# Patient Record
Sex: FEMALE | Race: WHITE | NOT HISPANIC OR LATINO | Employment: OTHER | URBAN - METROPOLITAN AREA
[De-identification: names, ages, dates, MRNs, and addresses within clinical notes are randomized per-mention and may not be internally consistent; named-entity substitution may affect disease eponyms.]

---

## 2017-05-11 ENCOUNTER — APPOINTMENT (OUTPATIENT)
Dept: WOMENS IMAGING | Facility: HOSPITAL | Age: 72
End: 2017-05-11

## 2017-05-11 PROCEDURE — G0279 TOMOSYNTHESIS, MAMMO: HCPCS | Performed by: RADIOLOGY

## 2017-05-11 PROCEDURE — G0204 DX MAMMO INCL CAD BI: HCPCS | Performed by: RADIOLOGY

## 2017-05-11 PROCEDURE — 76641 ULTRASOUND BREAST COMPLETE: CPT | Performed by: RADIOLOGY

## 2017-05-19 ENCOUNTER — APPOINTMENT (OUTPATIENT)
Dept: WOMENS IMAGING | Facility: HOSPITAL | Age: 72
End: 2017-05-19

## 2017-05-19 PROCEDURE — 19083 BX BREAST 1ST LESION US IMAG: CPT | Performed by: RADIOLOGY

## 2017-12-11 ENCOUNTER — APPOINTMENT (OUTPATIENT)
Dept: WOMENS IMAGING | Facility: HOSPITAL | Age: 72
End: 2017-12-11

## 2017-12-11 PROCEDURE — 76641 ULTRASOUND BREAST COMPLETE: CPT | Performed by: RADIOLOGY

## 2018-05-14 ENCOUNTER — APPOINTMENT (OUTPATIENT)
Dept: WOMENS IMAGING | Facility: HOSPITAL | Age: 73
End: 2018-05-14

## 2018-05-14 PROCEDURE — 77067 SCR MAMMO BI INCL CAD: CPT | Performed by: RADIOLOGY

## 2018-05-14 PROCEDURE — 77063 BREAST TOMOSYNTHESIS BI: CPT | Performed by: RADIOLOGY

## 2018-05-14 PROCEDURE — G0279 TOMOSYNTHESIS, MAMMO: HCPCS | Performed by: RADIOLOGY

## 2020-10-18 NOTE — PROGRESS NOTES
"Subjective   Chief Complaint   Patient presents with   • Headache     New PT        History of Present Illness   75 y.o. female presents as a new patient to establish care and discuss blood pressure concerns; former patient of Dr. Alan.     Notes her BP has been a little high running 140s/70s. Denies CP but has dyspnea secondary to asthma and started Dupixent with Dr. Frausto. Son has HTN and HLD. She is a former smoker--quit in 1980. Back on WW now and trying to lose weight--lost 7# this month.     Starts CPAP soon given newly diagnosed MEGAN.     Heavy vaginal bleeding \"like a period\" started last week. Dr. Peck upped her Progesterone to 200 mg daily and she continues Estradiol 0.025 mg patch twice weekly. If not resolved next month then will have hysterectomy.     Started Dupixant 300 mg daily--Dr. Frausto--recently.     Flu shot a couple of weeks ago at the pharmacy.     Patient Active Problem List   Diagnosis   • Mixed anxiety depressive disorder   • Hyperlipidemia   • Obesity (BMI 30-39.9)   • Nausea   • Malaise and fatigue   • Elevated blood pressure reading without diagnosis of hypertension   • Hypersomnia with sleep apnea - Dr Ledesma       Allergies   Allergen Reactions   • Pantoprazole Nausea Only     Other reaction(s): Headache   • Penicillins Hives       Current Outpatient Medications on File Prior to Visit   Medication Sig Dispense Refill   • ALPRAZolam (XANAX) 0.5 MG tablet Take 1 tablet by mouth.     • Ascorbic Acid (VITAMIN C) 500 MG capsule Take  by mouth.     • aspirin 81 MG chewable tablet Chew.     • Biotin 1000 MCG tablet Take  by mouth.     • budesonide-formoterol (SYMBICORT) 160-4.5 MCG/ACT inhaler Symbicort 160-4.5 MCG/ACT Inhalation Aerosol; Patient Sig: Symbicort 160-4.5 MCG/ACT Inhalation Aerosol ; 10; 0; 28-Jul-2015; Active     • buPROPion XL (WELLBUTRIN XL) 150 MG 24 hr tablet      • Cholecalciferol (VITAMIN D) 2000 UNITS capsule Take  by mouth.     • citalopram (CeleXA) 20 MG " tablet      • Dupixent 300 MG/2ML solution pen-injector      • EPINEPHrine (EPIPEN) 0.3 MG/0.3ML solution auto-injector injection EpiPen 2-Mitch 0.3 MG/0.3ML Injection Solution Auto-injector; Patient Sig: EpiPen 2-Mitch 0.3 MG/0.3ML Injection Solution Auto-injector ; 2; 0; 21-Nov-2014; Active     • esomeprazole (NexIUM) 40 MG capsule Take 1 capsule by mouth.     • Fish Oil-Krill Oil (KRILL OIL PLUS) capsule Take  by mouth.     • gabapentin (NEURONTIN) 100 MG capsule      • montelukast (SINGULAIR) 10 MG tablet Take  by mouth.     • Probiotic Product (ALIGN) capsule Take  by mouth.     • VENTOLIN  (90 BASE) MCG/ACT inhaler      • [DISCONTINUED] EMSAM 6 MG/24HR      • [DISCONTINUED] estradiol-norethindrone (COMBIPATCH) 0.05-0.14 MG/DAY patch Place  on the skin.     • [DISCONTINUED] mometasone-formoterol (DULERA 200) 200-5 MCG/ACT inhaler Dulera 200-5 MCG/ACT Inhalation Aerosol; Patient Sig: Dulera 200-5 MCG/ACT Inhalation Aerosol ; 0; 29-Sep-2014; Active     • [DISCONTINUED] Naproxen-Esomeprazole (VIMOVO PO) Take  by mouth 2 (two) times a day.     • [DISCONTINUED] ondansetron ODT (ZOFRAN-ODT) 8 MG disintegrating tablet      • [DISCONTINUED] progesterone (PROMETRIUM) 100 MG capsule        Current Facility-Administered Medications on File Prior to Visit   Medication Dose Route Frequency Provider Last Rate Last Dose   • promethazine (PHENERGAN) tablet 25 mg  25 mg Oral Q8H PRN Nj Coto MD           Past Medical History:   Diagnosis Date   • GERD (gastroesophageal reflux disease)    • Hemorrhoids 11/24/2015   • Hyperlipidemia    • Hypertension    • Impaired fasting glucose 11/24/2015    Description: NEW JO 09/14       Family History   Problem Relation Age of Onset   • Depression Mother    • Depression Brother    • Hypertension Daughter    • Hypertension Son        Social History     Socioeconomic History   • Marital status:      Spouse name: Not on file   • Number of children: Not on file   • Years of  "education: Not on file   • Highest education level: Not on file   Tobacco Use   • Smoking status: Former Smoker   Substance and Sexual Activity   • Alcohol use: No   • Drug use: No   • Sexual activity: Never       Past Surgical History:   Procedure Laterality Date   • BREAST SURGERY     • CHOLECYSTECTOMY     • COLONOSCOPY     • INCISIONAL BREAST BIOPSY     • OOPHORECTOMY     • TONSILLECTOMY AND ADENOIDECTOMY         The following portions of the patient's history were reviewed and updated as appropriate: problem list, allergies, current medications, past medical history, past family history, past social history and past surgical history.    Review of Systems   Constitutional: Negative for fatigue.   HENT: Negative for congestion.    Respiratory: Positive for shortness of breath.    Cardiovascular: Negative for chest pain.   Gastrointestinal: Positive for GERD.   Genitourinary: Positive for vaginal bleeding.   Musculoskeletal: Negative.    Skin: Negative.    Psychiatric/Behavioral: Positive for depressed mood. The patient is nervous/anxious.        Immunization History   Administered Date(s) Administered   • Flu Vaccine Quad PF >36MO 09/18/2018   • Fluzone High Dose =>65 Years ([facility] ONLY) 10/01/2015, 09/18/2018, 10/08/2019   • Hepatitis A 04/26/2018, 11/02/2018   • Influenza Quad Vaccine (Inpatient) 09/20/2017   • Influenza TIV (IM) 09/20/2017   • Influenza, Unspecified 10/15/2015   • Pneumococcal Conjugate 13-Valent (PCV13) 09/28/2015   • Shingrix 10/30/2019, 10/06/2020   • Td, Unspecified 08/15/2006   • Tdap 09/06/2016   • Zostavax 02/15/2007       Objective   Vitals:    10/19/20 1102   Temp: 97.3 °F (36.3 °C)   Weight: 112 kg (246 lb)   Height: 168.9 cm (66.5\")     Body mass index is 39.11 kg/m².  Physical Exam  Vitals signs reviewed.   Constitutional:       Appearance: Normal appearance. She is well-developed. She is obese.   HENT:      Head: Normocephalic and atraumatic.      Mouth/Throat:      Mouth: Mucous " membranes are moist.      Pharynx: Oropharynx is clear. No oropharyngeal exudate or posterior oropharyngeal erythema.   Neck:      Musculoskeletal: Neck supple.      Thyroid: No thyromegaly.      Vascular: No carotid bruit.   Cardiovascular:      Rate and Rhythm: Normal rate and regular rhythm.      Heart sounds: Normal heart sounds, S1 normal and S2 normal.   Pulmonary:      Effort: Pulmonary effort is normal.      Breath sounds: Normal breath sounds.   Musculoskeletal:      Comments: Ambulates without assistance; no clubbing, cyanosis or edema.    Skin:     General: Skin is warm and dry.   Neurological:      Mental Status: She is alert.   Psychiatric:         Mood and Affect: Mood normal.         Behavior: Behavior normal.         Procedures    Assessment/Plan   Diagnoses and all orders for this visit:    1. Elevated blood pressure reading without diagnosis of hypertension (Primary)  Comments:  Labs today--recheck BP in 2 weeks. Weight loss via continued dietary changes advised.     2. Hyperlipidemia, unspecified hyperlipidemia type  -     Lipid Panel With / Chol / HDL Ratio    3. Need for hepatitis C screening test  -     Hepatitis C Antibody    4. Healthcare maintenance  -     Comprehensive Metabolic Panel    5. Need for 23-polyvalent pneumococcal polysaccharide vaccine  -     Pneumococcal Polysaccharide Vaccine 23-Valent Greater Than or Equal To 3yo Subcutaneous / IM    Records reviewed include previous OV with myself as well as labs.     Tells me her shortness of breath is secondary to her asthma for which she recently restarted Symbicort and started Dupixent with Dr. Frausto. Will see her back in 2 weeks and will get an EKG if BP elevated and consider CXR.     Requested last OV with Dr. Peck and Dr. Frausto.     Return in about 2 weeks (around 11/2/2020) for Medicare Wellness, Lab Today.

## 2020-10-19 ENCOUNTER — OFFICE VISIT (OUTPATIENT)
Dept: INTERNAL MEDICINE | Facility: CLINIC | Age: 75
End: 2020-10-19

## 2020-10-19 VITALS
WEIGHT: 246 LBS | BODY MASS INDEX: 38.61 KG/M2 | SYSTOLIC BLOOD PRESSURE: 140 MMHG | TEMPERATURE: 97.3 F | RESPIRATION RATE: 14 BRPM | DIASTOLIC BLOOD PRESSURE: 90 MMHG | HEIGHT: 67 IN | HEART RATE: 68 BPM

## 2020-10-19 DIAGNOSIS — Z11.59 NEED FOR HEPATITIS C SCREENING TEST: ICD-10-CM

## 2020-10-19 DIAGNOSIS — Z23 NEED FOR 23-POLYVALENT PNEUMOCOCCAL POLYSACCHARIDE VACCINE: ICD-10-CM

## 2020-10-19 DIAGNOSIS — E78.5 HYPERLIPIDEMIA, UNSPECIFIED HYPERLIPIDEMIA TYPE: ICD-10-CM

## 2020-10-19 DIAGNOSIS — R03.0 ELEVATED BLOOD PRESSURE READING WITHOUT DIAGNOSIS OF HYPERTENSION: Primary | ICD-10-CM

## 2020-10-19 DIAGNOSIS — Z00.00 HEALTHCARE MAINTENANCE: ICD-10-CM

## 2020-10-19 PROCEDURE — G0009 ADMIN PNEUMOCOCCAL VACCINE: HCPCS | Performed by: NURSE PRACTITIONER

## 2020-10-19 PROCEDURE — 90732 PPSV23 VACC 2 YRS+ SUBQ/IM: CPT | Performed by: NURSE PRACTITIONER

## 2020-10-19 PROCEDURE — 99203 OFFICE O/P NEW LOW 30 MIN: CPT | Performed by: NURSE PRACTITIONER

## 2020-10-19 RX ORDER — BUPROPION HYDROCHLORIDE 150 MG/1
TABLET ORAL
COMMUNITY
Start: 2020-10-04

## 2020-10-19 RX ORDER — CITALOPRAM 20 MG/1
TABLET ORAL
COMMUNITY
Start: 2020-10-13

## 2020-10-19 RX ORDER — DUPILUMAB 300 MG/2ML
INJECTION, SOLUTION SUBCUTANEOUS
COMMUNITY
Start: 2020-10-08

## 2020-10-19 RX ORDER — ESTRADIOL 0.03 MG/D
1 FILM, EXTENDED RELEASE TRANSDERMAL 2 TIMES WEEKLY
COMMUNITY
End: 2022-11-27

## 2020-10-20 LAB
ALBUMIN SERPL-MCNC: 4.5 G/DL (ref 3.5–5.2)
ALBUMIN/GLOB SERPL: 1.8 G/DL
ALP SERPL-CCNC: 73 U/L (ref 39–117)
ALT SERPL-CCNC: 18 U/L (ref 1–33)
AST SERPL-CCNC: 19 U/L (ref 1–32)
BILIRUB SERPL-MCNC: 0.4 MG/DL (ref 0–1.2)
BUN SERPL-MCNC: 18 MG/DL (ref 8–23)
BUN/CREAT SERPL: 17.6 (ref 7–25)
CALCIUM SERPL-MCNC: 9.2 MG/DL (ref 8.6–10.5)
CHLORIDE SERPL-SCNC: 103 MMOL/L (ref 98–107)
CHOLEST SERPL-MCNC: 243 MG/DL (ref 0–200)
CHOLEST/HDLC SERPL: 2.67 {RATIO}
CO2 SERPL-SCNC: 24.2 MMOL/L (ref 22–29)
CREAT SERPL-MCNC: 1.02 MG/DL (ref 0.57–1)
GLOBULIN SER CALC-MCNC: 2.5 GM/DL
GLUCOSE SERPL-MCNC: 106 MG/DL (ref 65–99)
HCV AB S/CO SERPL IA: <0.1 S/CO RATIO (ref 0–0.9)
HDLC SERPL-MCNC: 91 MG/DL (ref 40–60)
LDLC SERPL CALC-MCNC: 140 MG/DL (ref 0–100)
POTASSIUM SERPL-SCNC: 4.8 MMOL/L (ref 3.5–5.2)
PROT SERPL-MCNC: 7 G/DL (ref 6–8.5)
SODIUM SERPL-SCNC: 136 MMOL/L (ref 136–145)
TRIGL SERPL-MCNC: 72 MG/DL (ref 0–150)
VLDLC SERPL CALC-MCNC: 12 MG/DL (ref 5–40)

## 2020-10-21 ENCOUNTER — TELEPHONE (OUTPATIENT)
Dept: INTERNAL MEDICINE | Facility: CLINIC | Age: 75
End: 2020-10-21

## 2020-10-21 DIAGNOSIS — E78.5 HYPERLIPIDEMIA, UNSPECIFIED HYPERLIPIDEMIA TYPE: ICD-10-CM

## 2020-10-21 DIAGNOSIS — R73.01 IMPAIRED FASTING GLUCOSE: Primary | ICD-10-CM

## 2020-10-21 RX ORDER — ROSUVASTATIN CALCIUM 5 MG/1
5 TABLET, COATED ORAL DAILY
Qty: 90 TABLET | Refills: 1 | Status: SHIPPED | OUTPATIENT
Start: 2020-10-21 | End: 2021-03-02 | Stop reason: SDUPTHER

## 2020-10-21 NOTE — TELEPHONE ENCOUNTER
PATIENT WAS CALLING TO CLARIFY IF SHE WAS SUPPOSED TO START BP MEDS OR WAIT UNTIL AFTER 2 WEEKS FOLLOW UP    PRESCRIPTION FOR ROSUVASTATIN WAS CALLED IN TO [facility] [place] FOR HER, BUT SHE THOUGHT SHE WAS SUPPOSED TO WAIT.     PLEASE ADVISE PATIENT  [phone]

## 2020-10-21 NOTE — TELEPHONE ENCOUNTER
Crestor is for her cholesterol. Will check her blood pressure at upcoming visit and go from there regarding whether she needs medication for it.

## 2020-10-26 ENCOUNTER — RESULTS ENCOUNTER (OUTPATIENT)
Dept: INTERNAL MEDICINE | Facility: CLINIC | Age: 75
End: 2020-10-26

## 2020-10-26 DIAGNOSIS — R73.01 IMPAIRED FASTING GLUCOSE: ICD-10-CM

## 2020-10-30 ENCOUNTER — LAB (OUTPATIENT)
Dept: INTERNAL MEDICINE | Facility: CLINIC | Age: 75
End: 2020-10-30

## 2020-10-30 LAB — HBA1C MFR BLD: 5.4 % (ref 4.8–5.6)

## 2020-11-01 NOTE — PROGRESS NOTES
The ABCs of the Annual Wellness Visit  Subsequent Medicare Wellness Visit    No chief complaint on file.      Subjective   History of Present Illness:  Luzmaria Pérez is a 75 y.o. female who presents for a Subsequent Medicare Wellness Visit.    Also presents for follow up of her blood pressure which was elevated at previous visit. Started Crestor and tolerating it pretty well--has to take Tylenol or Advil in the evening--relieves her joint pain; feels this may be as a result of Dupixent.     5 days into using CPAP. Feeling more rested and hopes to taper off Neurontin soon. Denies CP. Dyspnea (secondary to asthma per patient) stable. Fell off the wagon with WW over Halloween and gained 3# sin e last visit. Realizes this is not sustainable.     DUB stopped with increase in progesterone and decreased estrogen patch with Dr. Peck.      HEALTH RISK ASSESSMENT    Recent Hospitalizations:  No hospitalization(s) within the last year.    Current Medical Providers:  Patient Care Team:  Ameena Santos MD (Inactive) as PCP - General    Smoking Status:  Social History     Tobacco Use   Smoking Status Former Smoker       Alcohol Consumption:  Social History     Substance and Sexual Activity   Alcohol Use No       Depression Screen:   PHQ-2/PHQ-9 Depression Screening 11/1/2020   Little interest or pleasure in doing things Not at all   Feeling down, depressed, or hopeless Not at all   Trouble falling or staying asleep, or sleeping too much Several days   Feeling tired or having little energy More than half the days   Poor appetite or overeating Not at all   Feeling bad about yourself - or that you are a failure or have let yourself or your family down Not at all   Trouble concentrating on things, such as reading the newspaper or watching television Not at all   Moving or speaking so slowly that other people could have noticed. Or the opposite - being so fidgety or restless that you have been moving around a lot more than usual Not  at all   Thoughts that you would be better off dead, or of hurting yourself in some way Not at all   How difficult have these problems made it for you to do your work, take care of things at home, or get along with other people? Not difficult at all       Fall Risk Screen:  CLAUDIA Fall Risk Assessment has not been completed.    Health Habits and Functional and Cognitive Screening:  No flowsheet data found.      Does the patient have evidence of cognitive impairment? No    Asprin use counseling:Does not need AS but is currently taking (discussed benefits vs risks and patient elects to stay on ASA)    Age-appropriate Screening Schedule:  Refer to the list below for future screening recommendations based on patient's age, sex and/or medical conditions. Orders for these recommended tests are listed in the plan section. The patient has been provided with a written plan.    Health Maintenance   Topic Date Due   • INFLUENZA VACCINE  08/01/2020   • LIPID PANEL  10/19/2021   • COLONOSCOPY  01/17/2022   • TDAP/TD VACCINES (2 - Td) 09/06/2026   • ZOSTER VACCINE  Completed          The following portions of the patient's history were reviewed and updated as appropriate: allergies, current medications, past family history, past medical history, past social history, past surgical history and problem list.    Outpatient Medications Prior to Visit   Medication Sig Dispense Refill   • ALPRAZolam (XANAX) 0.5 MG tablet Take 1 tablet by mouth.     • Ascorbic Acid (VITAMIN C) 500 MG capsule Take  by mouth.     • aspirin 81 MG chewable tablet Chew.     • Biotin 1000 MCG tablet Take  by mouth.     • budesonide-formoterol (SYMBICORT) 160-4.5 MCG/ACT inhaler Symbicort 160-4.5 MCG/ACT Inhalation Aerosol; Patient Sig: Symbicort 160-4.5 MCG/ACT Inhalation Aerosol ; 10; 0; 28-Jul-2015; Active     • buPROPion XL (WELLBUTRIN XL) 150 MG 24 hr tablet      • Cholecalciferol (VITAMIN D) 2000 UNITS capsule Take  by mouth.     • citalopram (CeleXA) 20  MG tablet      • Dupixent 300 MG/2ML solution pen-injector      • EPINEPHrine (EPIPEN) 0.3 MG/0.3ML solution auto-injector injection EpiPen 2-Mitch 0.3 MG/0.3ML Injection Solution Auto-injector; Patient Sig: EpiPen 2-Mitch 0.3 MG/0.3ML Injection Solution Auto-injector ; 2; 0; 21-Nov-2014; Active     • esomeprazole (NexIUM) 40 MG capsule Take 1 capsule by mouth.     • estradiol (VIVELLE-DOT) 0.025 MG/24HR patch Place 1 patch on the skin as directed by provider 2 (Two) Times a Week.     • Fish Oil-Krill Oil (KRILL OIL PLUS) capsule Take  by mouth.     • gabapentin (NEURONTIN) 100 MG capsule      • montelukast (SINGULAIR) 10 MG tablet Take  by mouth.     • Probiotic Product (ALIGN) capsule Take  by mouth.     • progesterone (PROMETRIUM) 200 MG capsule Take 200 mg by mouth Daily.     • rosuvastatin (Crestor) 5 MG tablet Take 1 tablet by mouth Daily. 90 tablet 1   • VENTOLIN  (90 BASE) MCG/ACT inhaler        Facility-Administered Medications Prior to Visit   Medication Dose Route Frequency Provider Last Rate Last Dose   • promethazine (PHENERGAN) tablet 25 mg  25 mg Oral Q8H PRN Nj Coto MD           Patient Active Problem List   Diagnosis   • Mixed anxiety depressive disorder   • Hyperlipidemia   • Obesity (BMI 30-39.9)   • Nausea   • Malaise and fatigue   • Elevated blood pressure reading without diagnosis of hypertension   • Hypersomnia with sleep apnea - Dr Ledesma       Advanced Care Planning:  ACP discussion was held with the patient during this visit. Patient does not have an advance directive, information provided.    Review of Systems   Respiratory:        Dyspnea stable--recently started Dupixent.    Cardiovascular: Negative for chest pain.       Compared to one year ago, the patient feels her physical health is worse.  Compared to one year ago, the patient feels her mental health is the same.    Reviewed chart for potential of high risk medication in the elderly: yes  Reviewed chart for potential  of harmful drug interactions in the elderly:yes    Objective       There were no vitals filed for this visit.    There is no height or weight on file to calculate BMI.  Discussed the patient's BMI with her. The BMI is above average; BMI management plan is completed.    Physical Exam  Vitals signs reviewed.   Constitutional:       Appearance: Normal appearance. She is well-developed. She is obese.   HENT:      Head: Normocephalic and atraumatic.   Cardiovascular:      Rate and Rhythm: Normal rate and regular rhythm.      Heart sounds: Normal heart sounds, S1 normal and S2 normal.   Pulmonary:      Effort: Pulmonary effort is normal.      Breath sounds: Normal breath sounds.   Skin:     General: Skin is warm and dry.   Neurological:      Mental Status: She is alert.   Psychiatric:         Mood and Affect: Mood normal.         Behavior: Behavior normal.         Lab Results   Component Value Date     (H) 10/19/2020    CHLPL 243 (H) 10/19/2020    TRIG 72 10/19/2020    HDL 91 (H) 10/19/2020     (H) 10/19/2020    VLDL 12 10/19/2020    HGBA1C 5.40 10/30/2020        Assessment/Plan   Medicare Risks and Personalized Health Plan  CMS Preventative Services Quick Reference  Immunizations Discussed/Encouraged (specific immunizations; Influenza )    The above risks/problems have been discussed with the patient.  Pertinent information has been shared with the patient in the After Visit Summary.  Follow up plans and orders are seen below in the Assessment/Plan Section.    Diagnoses and all orders for this visit:    1. Medicare annual wellness visit, subsequent (Primary)  Comments:  Living Will advised. Flu shot ealtier this month at local pharmacy.     2. Morbidly obese (CMS/[facility])  Comments:  Weight loss via dietary changes & 150 minutes weekly exercise advised--encouraged 10 minutes once daily for 1-2 weeks then add 5 minutes in afternoon increasing    3. Hyperlipidemia, unspecified hyperlipidemia  type  Comments:  Tolerating Crestor--recheck LFTs and FLP in 3 months    4. Elevated blood pressure reading in office without diagnosis of hypertension  Comments:  Controlled at this time.       Follow Up:  No follow-ups on file.     Records reviewed include previous OV with myself as well as labs.     An After Visit Summary and PPPS were given to the patient.

## 2020-11-02 ENCOUNTER — OFFICE VISIT (OUTPATIENT)
Dept: INTERNAL MEDICINE | Facility: CLINIC | Age: 75
End: 2020-11-02

## 2020-11-02 VITALS
TEMPERATURE: 97.4 F | RESPIRATION RATE: 12 BRPM | SYSTOLIC BLOOD PRESSURE: 112 MMHG | WEIGHT: 249 LBS | BODY MASS INDEX: 39.08 KG/M2 | HEIGHT: 67 IN | HEART RATE: 84 BPM | DIASTOLIC BLOOD PRESSURE: 70 MMHG

## 2020-11-02 DIAGNOSIS — E78.5 HYPERLIPIDEMIA, UNSPECIFIED HYPERLIPIDEMIA TYPE: ICD-10-CM

## 2020-11-02 DIAGNOSIS — E66.01 MORBIDLY OBESE (HCC): ICD-10-CM

## 2020-11-02 DIAGNOSIS — Z00.00 MEDICARE ANNUAL WELLNESS VISIT, SUBSEQUENT: Primary | ICD-10-CM

## 2020-11-02 DIAGNOSIS — R03.0 ELEVATED BLOOD PRESSURE READING IN OFFICE WITHOUT DIAGNOSIS OF HYPERTENSION: ICD-10-CM

## 2020-11-02 PROCEDURE — 99213 OFFICE O/P EST LOW 20 MIN: CPT | Performed by: NURSE PRACTITIONER

## 2020-11-02 PROCEDURE — G0439 PPPS, SUBSEQ VISIT: HCPCS | Performed by: NURSE PRACTITIONER

## 2021-03-02 DIAGNOSIS — R73.01 IMPAIRED FASTING GLUCOSE: Primary | ICD-10-CM

## 2021-03-02 DIAGNOSIS — E78.5 HYPERLIPIDEMIA, UNSPECIFIED HYPERLIPIDEMIA TYPE: ICD-10-CM

## 2021-03-03 RX ORDER — ROSUVASTATIN CALCIUM 5 MG/1
5 TABLET, COATED ORAL DAILY
Qty: 90 TABLET | Refills: 1 | Status: SHIPPED | OUTPATIENT
Start: 2021-03-03 | End: 2021-05-03 | Stop reason: SDUPTHER

## 2021-04-30 PROBLEM — K21.9 GERD (GASTROESOPHAGEAL REFLUX DISEASE): Status: ACTIVE | Noted: 2021-04-30

## 2021-05-02 RX ORDER — AZELASTINE HYDROCHLORIDE, FLUTICASONE PROPIONATE 137; 50 UG/1; UG/1
SPRAY, METERED NASAL
COMMUNITY
Start: 2021-02-14

## 2021-05-03 ENCOUNTER — OFFICE VISIT (OUTPATIENT)
Dept: INTERNAL MEDICINE | Facility: CLINIC | Age: 76
End: 2021-05-03

## 2021-05-03 VITALS — WEIGHT: 250 LBS | BODY MASS INDEX: 39.24 KG/M2 | HEIGHT: 67 IN | TEMPERATURE: 96.9 F

## 2021-05-03 DIAGNOSIS — E78.5 HYPERLIPIDEMIA, UNSPECIFIED HYPERLIPIDEMIA TYPE: Primary | ICD-10-CM

## 2021-05-03 DIAGNOSIS — Z00.00 HEALTHCARE MAINTENANCE: ICD-10-CM

## 2021-05-03 DIAGNOSIS — Z92.29 COVID-19 VACCINE SERIES COMPLETED: ICD-10-CM

## 2021-05-03 DIAGNOSIS — E66.01 MORBIDLY OBESE (HCC): ICD-10-CM

## 2021-05-03 DIAGNOSIS — K21.9 GASTROESOPHAGEAL REFLUX DISEASE, UNSPECIFIED WHETHER ESOPHAGITIS PRESENT: ICD-10-CM

## 2021-05-03 DIAGNOSIS — R73.01 IMPAIRED FASTING GLUCOSE: ICD-10-CM

## 2021-05-03 PROCEDURE — 99214 OFFICE O/P EST MOD 30 MIN: CPT | Performed by: NURSE PRACTITIONER

## 2021-05-03 RX ORDER — ROSUVASTATIN CALCIUM 5 MG/1
5 TABLET, COATED ORAL DAILY
Qty: 90 TABLET | Refills: 3 | Status: SHIPPED | OUTPATIENT
Start: 2021-05-03 | End: 2022-06-14

## 2021-09-07 ENCOUNTER — TELEPHONE (OUTPATIENT)
Dept: INTERNAL MEDICINE | Facility: CLINIC | Age: 76
End: 2021-09-07

## 2021-09-07 NOTE — TELEPHONE ENCOUNTER
PATIENT CALLING WANTING TO KNOW IF  WANTED TO START HER ON BLOOD PRESSURE MEDICATION DUE TO ELEVATED LEVELS SHE STATED HER BLOOD PRESSURE THIS MORNING /92, 166/88.    PLEASE ADVISE  [phone]

## 2021-09-08 NOTE — PROGRESS NOTES
Subjective   Chief Complaint   Patient presents with   • Hypertension     Was at Allergist on Tuesday and they told her BP was elevated        History of Present Illness   76 y.o. female presents with reports of elevated home blood pressure. She has a history of HTN which she has bee treated in the past bur improved with [facility] to the point she was able to get of BP meds (HCTZ 12.5 mg daily) with previous provider. Notes BP elevated at her allergist 2 days ago--160s/88-92. Notes dull HA. Denies CP; dyspnea related to her asthma--Dupixent has helped; managed by Dr. Frausto. She is down 5# and has just gotten cleared by ortho to get back in the pool.       Patient Active Problem List   Diagnosis   • Mixed anxiety depressive disorder   • Hyperlipidemia   • Impaired fasting glucose   • Obesity (BMI 30-39.9)   • Nausea   • Malaise and fatigue   • Hypersomnia with sleep apnea - Dr Ledesma   • Morbidly obese (CMS/HCC)   • Asthma   • GERD (gastroesophageal reflux disease)   • Arthritis of left knee   • Essential hypertension       Allergies   Allergen Reactions   • Adhesive Tape Hives   • Pantoprazole Nausea Only     Other reaction(s): Headache   • Penicillins Hives       Current Outpatient Medications on File Prior to Visit   Medication Sig Dispense Refill   • ALPRAZolam (XANAX) 0.5 MG tablet Take 1 tablet by mouth.     • Ascorbic Acid (VITAMIN C) 500 MG capsule Take  by mouth.     • aspirin 81 MG chewable tablet Chew.     • Azelastine-Fluticasone 137-50 MCG/ACT suspension      • Biotin 1000 MCG tablet Take  by mouth.     • budesonide-formoterol (SYMBICORT) 160-4.5 MCG/ACT inhaler Symbicort 160-4.5 MCG/ACT Inhalation Aerosol; Patient Sig: Symbicort 160-4.5 MCG/ACT Inhalation Aerosol ; 10; 0; 28-Jul-2015; Active     • buPROPion XL (WELLBUTRIN XL) 150 MG 24 hr tablet      • Cholecalciferol (VITAMIN D) 2000 UNITS capsule Take  by mouth.     • citalopram (CeleXA) 20 MG tablet      • Dupixent 300 MG/2ML solution pen-injector       • EPINEPHrine (EPIPEN) 0.3 MG/0.3ML solution auto-injector injection EpiPen 2-Mitch 0.3 MG/0.3ML Injection Solution Auto-injector; Patient Sig: EpiPen 2-Mitch 0.3 MG/0.3ML Injection Solution Auto-injector ; 2; 0; 21-Nov-2014; Active     • esomeprazole (NexIUM) 40 MG capsule Take 1 capsule by mouth.     • estradiol (VIVELLE-DOT) 0.025 MG/24HR patch Place 1 patch on the skin as directed by provider 2 (Two) Times a Week.     • Fish Oil-Krill Oil (KRILL OIL PLUS) capsule Take  by mouth.     • gabapentin (NEURONTIN) 100 MG capsule 100 mg Daily.     • montelukast (SINGULAIR) 10 MG tablet Take  by mouth.     • Probiotic Product (ALIGN) capsule Take  by mouth.     • progesterone (PROMETRIUM) 200 MG capsule Take 200 mg by mouth Daily.     • rosuvastatin (Crestor) 5 MG tablet Take 1 tablet by mouth Daily. 90 tablet 3   • VENTOLIN  (90 BASE) MCG/ACT inhaler        Current Facility-Administered Medications on File Prior to Visit   Medication Dose Route Frequency Provider Last Rate Last Admin   • promethazine (PHENERGAN) tablet 25 mg  25 mg Oral Q8H PRN Nj Coto MD           Past Medical History:   Diagnosis Date   • Achilles tendinitis of right lower extremity    • Esophageal stricture    • GERD (gastroesophageal reflux disease)    • Hemorrhoids 11/24/2015   • Hyperlipidemia    • Impaired fasting glucose 11/24/2015    Description: NEW DXZ 09/14   • Inflammatory fibroid polyps of stomach        Family History   Problem Relation Age of Onset   • Depression Mother    • Depression Brother    • Hypertension Daughter    • Hypertension Son        Social History     Socioeconomic History   • Marital status:      Spouse name: Not on file   • Number of children: Not on file   • Years of education: Not on file   • Highest education level: Not on file   Tobacco Use   • Smoking status: Former Smoker   Substance and Sexual Activity   • Alcohol use: No   • Drug use: No   • Sexual activity: Never       Past Surgical  "History:   Procedure Laterality Date   • BREAST SURGERY     • CHOLECYSTECTOMY     • COLONOSCOPY     • INCISIONAL BREAST BIOPSY     • OOPHORECTOMY     • REPLACEMENT TOTAL KNEE Left    • TONSILLECTOMY AND ADENOIDECTOMY     • TOTAL SHOULDER ARTHROPLASTY         The following portions of the patient's history were reviewed and updated as appropriate: problem list, allergies, current medications, past medical history and past social history.    Review of Systems    Immunization History   Administered Date(s) Administered   • COVID-19 (PFIZER) 02/04/2021, 02/25/2021   • FLUAD TRI 65YR+ 10/06/2020   • Flu Vaccine Quad PF >36MO 09/18/2018   • Fluzone High Dose =>65 Years ([facility] ONLY) 10/01/2015, 09/18/2018, 10/08/2019   • Fluzone High-Dose 65+yrs 08/25/2021   • Hepatitis A 04/26/2018, 11/02/2018   • Influenza Quad Vaccine (Inpatient) 09/20/2017   • Influenza TIV (IM) 09/20/2017   • Influenza, Unspecified 10/15/2015   • Pneumococcal Conjugate 13-Valent (PCV13) 09/28/2015   • Pneumococcal Polysaccharide (PPSV23) 10/19/2020   • Shingrix 10/30/2019, 10/06/2020, 10/23/2020   • TD Preservative Free 07/15/2021   • Td, Unspecified 08/15/2006   • Tdap 09/06/2016   • Zostavax 02/15/2007       Objective   Vitals:    09/09/21 1252   Temp: 97.4 °F (36.3 °C)   Weight: 111 kg (245 lb)   Height: 168.9 cm (66.5\")     Body mass index is 38.95 kg/m².  Physical Exam  Vitals reviewed.   Constitutional:       Appearance: Normal appearance. She is well-developed.   HENT:      Head: Normocephalic and atraumatic.   Cardiovascular:      Rate and Rhythm: Normal rate and regular rhythm.      Heart sounds: Normal heart sounds, S1 normal and S2 normal.   Pulmonary:      Effort: Pulmonary effort is normal.      Breath sounds: Normal breath sounds.   Skin:     General: Skin is warm and dry.   Neurological:      Mental Status: She is alert.   Psychiatric:         Mood and Affect: Mood normal.         Behavior: Behavior normal. "         Procedures    Assessment/Plan   Diagnoses and all orders for this visit:    1. Essential hypertension (Primary)  Comments:  Start Amlodipine 5 mg daily. Potential side effects reviewed. Weight loss via dietary changes and 150 minutes aerobic activity advised. RTO 1 month.   Orders:  -     amLODIPine (NORVASC) 5 MG tablet; Take 1 tablet by mouth Daily.  Dispense: 90 tablet; Refill: 0    2. Morbidly obese (CMS/HCC)  Comments:  Weight loss via dietary changes and 150 minutes aerobic activity advised.     Records reviewed include previous OV with myself as well as labs.     Return in about 1 month (around 10/9/2021).

## 2021-09-09 ENCOUNTER — OFFICE VISIT (OUTPATIENT)
Dept: INTERNAL MEDICINE | Facility: CLINIC | Age: 76
End: 2021-09-09

## 2021-09-09 VITALS — HEIGHT: 67 IN | WEIGHT: 245 LBS | BODY MASS INDEX: 38.45 KG/M2 | TEMPERATURE: 97.4 F

## 2021-09-09 DIAGNOSIS — E66.01 MORBIDLY OBESE (HCC): Chronic | ICD-10-CM

## 2021-09-09 DIAGNOSIS — I10 ESSENTIAL HYPERTENSION: Primary | ICD-10-CM

## 2021-09-09 PROCEDURE — 99214 OFFICE O/P EST MOD 30 MIN: CPT | Performed by: NURSE PRACTITIONER

## 2021-09-09 RX ORDER — AMLODIPINE BESYLATE 5 MG/1
5 TABLET ORAL DAILY
Qty: 90 TABLET | Refills: 0 | Status: SHIPPED | OUTPATIENT
Start: 2021-09-09 | End: 2021-10-15 | Stop reason: SDUPTHER

## 2021-10-10 PROBLEM — K21.9 GERD (GASTROESOPHAGEAL REFLUX DISEASE): Chronic | Status: ACTIVE | Noted: 2021-04-30

## 2021-10-10 PROBLEM — I10 ESSENTIAL HYPERTENSION: Chronic | Status: ACTIVE | Noted: 2021-09-09

## 2021-10-10 PROBLEM — E66.01 MORBIDLY OBESE: Status: RESOLVED | Noted: 2020-11-02 | Resolved: 2021-10-10

## 2021-10-10 NOTE — PROGRESS NOTES
Subjective   Chief Complaint   Patient presents with   • Hypertension     1 month follow up        History of Present Illness   76 y.o. female presents for follow up regarding HTN for which amlodipine 5 mg daily started. She has lost 8# in the last month with dietary changes and some exercise. Denies CP or dyspnea. Psychiatrist started her on Cytomel to augment her psych meds but she would like to come off of it.      Patient Active Problem List   Diagnosis   • Mixed anxiety depressive disorder   • Hyperlipidemia   • Impaired fasting glucose   • Obesity (BMI 30-39.9)   • Nausea   • Malaise and fatigue   • Hypersomnia with sleep apnea - Dr Ledesma   • Asthma   • GERD (gastroesophageal reflux disease)   • Arthritis of left knee   • Essential hypertension       Allergies   Allergen Reactions   • Adhesive Tape Hives   • Pantoprazole Nausea Only     Other reaction(s): Headache   • Penicillins Hives   • Nickel Rash     With cheap jewelry   • Troleandomycin Hives       Current Outpatient Medications on File Prior to Visit   Medication Sig Dispense Refill   • ALPRAZolam (XANAX) 0.5 MG tablet Take 1 tablet by mouth.     • Ascorbic Acid (VITAMIN C) 500 MG capsule Take  by mouth.     • aspirin 81 MG chewable tablet Chew.     • Azelastine-Fluticasone 137-50 MCG/ACT suspension      • Biotin 1000 MCG tablet Take  by mouth.     • budesonide-formoterol (SYMBICORT) 160-4.5 MCG/ACT inhaler Symbicort 160-4.5 MCG/ACT Inhalation Aerosol; Patient Sig: Symbicort 160-4.5 MCG/ACT Inhalation Aerosol ; 10; 0; 28-Jul-2015; Active     • buPROPion XL (WELLBUTRIN XL) 150 MG 24 hr tablet      • Cholecalciferol (VITAMIN D) 2000 UNITS capsule Take  by mouth.     • citalopram (CeleXA) 20 MG tablet      • Dupixent 300 MG/2ML solution pen-injector      • EPINEPHrine (EPIPEN) 0.3 MG/0.3ML solution auto-injector injection EpiPen 2-Mitch 0.3 MG/0.3ML Injection Solution Auto-injector; Patient Sig: EpiPen 2-Mitch 0.3 MG/0.3ML Injection Solution Auto-injector ;  2; 0; 21-Nov-2014; Active     • esomeprazole (NexIUM) 40 MG capsule Take 1 capsule by mouth.     • estradiol (VIVELLE-DOT) 0.025 MG/24HR patch Place 1 patch on the skin as directed by provider 2 (Two) Times a Week.     • Fish Oil-Krill Oil (KRILL OIL PLUS) capsule Take  by mouth.     • gabapentin (NEURONTIN) 100 MG capsule 100 mg Daily.     • montelukast (SINGULAIR) 10 MG tablet Take  by mouth.     • Probiotic Product (ALIGN) capsule Take  by mouth.     • progesterone (PROMETRIUM) 200 MG capsule Take 200 mg by mouth Daily.     • rosuvastatin (Crestor) 5 MG tablet Take 1 tablet by mouth Daily. 90 tablet 3   • VENTOLIN  (90 BASE) MCG/ACT inhaler      • [DISCONTINUED] amLODIPine (NORVASC) 5 MG tablet Take 1 tablet by mouth Daily. 90 tablet 0     Current Facility-Administered Medications on File Prior to Visit   Medication Dose Route Frequency Provider Last Rate Last Admin   • promethazine (PHENERGAN) tablet 25 mg  25 mg Oral Q8H PRN Nj Coto MD           Past Medical History:   Diagnosis Date   • Achilles tendinitis of right lower extremity    • Esophageal stricture    • GERD (gastroesophageal reflux disease)    • Hemorrhoids 11/24/2015   • Hyperlipidemia    • Impaired fasting glucose 11/24/2015    Description: NEW JO 09/14   • Inflammatory fibroid polyps of stomach        Family History   Problem Relation Age of Onset   • Depression Mother    • Depression Brother    • Hypertension Daughter    • Hypertension Son        Social History     Socioeconomic History   • Marital status:    Tobacco Use   • Smoking status: Former Smoker   Substance and Sexual Activity   • Alcohol use: No   • Drug use: No   • Sexual activity: Never       Past Surgical History:   Procedure Laterality Date   • BREAST SURGERY     • CHOLECYSTECTOMY     • COLONOSCOPY     • INCISIONAL BREAST BIOPSY     • OOPHORECTOMY     • REPLACEMENT TOTAL KNEE Left    • TONSILLECTOMY AND ADENOIDECTOMY     • TOTAL SHOULDER ARTHROPLASTY    "      The following portions of the patient's history were reviewed and updated as appropriate: problem list, allergies, current medications, past medical history and past social history.    Review of Systems    Immunization History   Administered Date(s) Administered   • COVID-19 (PFIZER) 02/04/2021, 02/25/2021   • FLUAD TRI 65YR+ 10/06/2020   • Flu Vaccine Quad PF >36MO 09/18/2018   • Fluzone High Dose =>65 Years ([facility] ONLY) 10/01/2015, 09/18/2018, 10/08/2019   • Fluzone High-Dose 65+yrs 08/25/2021   • Hepatitis A 04/26/2018, 11/02/2018   • Influenza Quad Vaccine (Inpatient) 09/20/2017   • Influenza TIV (IM) 09/20/2017   • Influenza, Unspecified 10/15/2015   • Pneumococcal Conjugate 13-Valent (PCV13) 09/28/2015   • Pneumococcal Polysaccharide (PPSV23) 10/19/2020   • Shingrix 10/30/2019, 10/06/2020, 10/23/2020   • TD Preservative Free 07/15/2021   • Td, Unspecified 08/15/2006   • Tdap 09/06/2016   • Zostavax 02/15/2007       Objective   Vitals:    10/15/21 0936   BP: 110/70   Pulse: 80   Resp: 14   Temp: 96.9 °F (36.1 °C)   Weight: 108 kg (237 lb)   Height: 168.9 cm (66.5\")     Body mass index is 37.68 kg/m².  Physical Exam  Vitals reviewed.   Constitutional:       Appearance: Normal appearance. She is well-developed.   HENT:      Head: Normocephalic and atraumatic.   Cardiovascular:      Rate and Rhythm: Normal rate and regular rhythm.      Heart sounds: Normal heart sounds, S1 normal and S2 normal.   Pulmonary:      Effort: Pulmonary effort is normal.      Breath sounds: Normal breath sounds.   Skin:     General: Skin is warm and dry.   Neurological:      Mental Status: She is alert.   Psychiatric:         Mood and Affect: Mood normal.         Behavior: Behavior normal.         Procedures    Assessment/Plan   Diagnoses and all orders for this visit:    1. Essential hypertension (Primary)  Comments:  Controlled. Continue current medications and I will see her back in 3 weeks for [facility] visit.   Orders:  -     " amLODIPine (NORVASC) 5 MG tablet; Take 1 tablet by mouth Daily.  Dispense: 90 tablet; Refill: 1    2. Obesity (BMI 30-39.9)  Comments:  She has lost 13 pounds over the last few months with LSM. Continue weight loss via dietary changes and 150 minutes weekly exercise.     3. Fatigue, unspecified type  -     TSH; Future  -     CBC & Differential; Future    Records reviewed include previous OV with myself as well as labs.     Return for Next scheduled follow up.

## 2021-10-12 ENCOUNTER — HOSPITAL ENCOUNTER (OUTPATIENT)
Dept: GENERAL RADIOLOGY | Facility: HOSPITAL | Age: 76
Discharge: HOME | End: 2021-10-12
Admitting: ALLERGY & IMMUNOLOGY

## 2021-10-12 DIAGNOSIS — R05.9 COUGH: ICD-10-CM

## 2021-10-12 PROCEDURE — 71046 X-RAY EXAM CHEST 2 VIEWS: CPT

## 2021-10-15 ENCOUNTER — OFFICE VISIT (OUTPATIENT)
Dept: INTERNAL MEDICINE | Facility: CLINIC | Age: 76
End: 2021-10-15

## 2021-10-15 VITALS
WEIGHT: 237 LBS | DIASTOLIC BLOOD PRESSURE: 70 MMHG | HEART RATE: 80 BPM | BODY MASS INDEX: 37.2 KG/M2 | HEIGHT: 67 IN | TEMPERATURE: 96.9 F | SYSTOLIC BLOOD PRESSURE: 110 MMHG | RESPIRATION RATE: 14 BRPM

## 2021-10-15 DIAGNOSIS — I10 ESSENTIAL HYPERTENSION: Primary | Chronic | ICD-10-CM

## 2021-10-15 DIAGNOSIS — R53.83 FATIGUE, UNSPECIFIED TYPE: ICD-10-CM

## 2021-10-15 DIAGNOSIS — E66.9 OBESITY (BMI 30-39.9): Chronic | ICD-10-CM

## 2021-10-15 PROCEDURE — 99214 OFFICE O/P EST MOD 30 MIN: CPT | Performed by: NURSE PRACTITIONER

## 2021-10-15 RX ORDER — AMLODIPINE BESYLATE 5 MG/1
5 TABLET ORAL DAILY
Qty: 90 TABLET | Refills: 1 | Status: SHIPPED | OUTPATIENT
Start: 2021-10-15 | End: 2022-05-13

## 2021-11-06 NOTE — PROGRESS NOTES
The ABCs of the Annual Wellness Visit  Subsequent Medicare Wellness Visit    Chief Complaint   Patient presents with   • Medicare Wellness-subsequent   • Shortness of Breath   • Hypertension   • Prediabetes   • Hyperlipidemia      Subjective    History of Present Illness:  Luzmaria Pérez is a 76 y.o. female who presents for a Subsequent Medicare Wellness Visit.    Also presents for follow up regarding HTN, preDM, HLD, obesity and fatigue. Down another 2# for a total of 15# in 6 months. Notes her breathing is worse lately over the last few months; she has had it for years though. . She has seen Dr. Rich in the past for her asthma. Uses rescue inhaler 2-3x weekly. Also followed by Dr. Frausto who recently ordered a CXR. No EKG since 2016. Stress testing 15-20 years ago ok. Denies CP. Dyspnea on exertion. Worse after 30-40 steps on flat ground. Not exercising yet. Last CPAP check a couple of months ago and got all new equipment.     The following portions of the patient's history were reviewed and   updated as appropriate: allergies, current medications, past family history, past medical history, past social history, past surgical history and problem list.    Compared to one year ago, the patient feels her physical   health is the same.    Compared to one year ago, the patient feels her mental   health is better.    Recent Hospitalizations:  She was not admitted to the hospital during the last year.       Current Medical Providers:  Patient Care Team:  Keiry Means APRN as PCP - General (Family Medicine)    Outpatient Medications Prior to Visit   Medication Sig Dispense Refill   • ALPRAZolam (XANAX) 0.5 MG tablet Take 1 tablet by mouth.     • amLODIPine (NORVASC) 5 MG tablet Take 1 tablet by mouth Daily. 90 tablet 1   • Ascorbic Acid (VITAMIN C) 500 MG capsule Take  by mouth.     • aspirin 81 MG chewable tablet Chew.     • Azelastine-Fluticasone 137-50 MCG/ACT suspension      • Biotin 1000 MCG tablet Take   by mouth.     • budesonide-formoterol (SYMBICORT) 160-4.5 MCG/ACT inhaler Symbicort 160-4.5 MCG/ACT Inhalation Aerosol; Patient Sig: Symbicort 160-4.5 MCG/ACT Inhalation Aerosol ; 10; 0; 28-Jul-2015; Active     • buPROPion XL (WELLBUTRIN XL) 150 MG 24 hr tablet      • Cholecalciferol (VITAMIN D) 2000 UNITS capsule Take  by mouth.     • citalopram (CeleXA) 20 MG tablet      • Dupixent 300 MG/2ML solution pen-injector      • EPINEPHrine (EPIPEN) 0.3 MG/0.3ML solution auto-injector injection EpiPen 2-Mitch 0.3 MG/0.3ML Injection Solution Auto-injector; Patient Sig: EpiPen 2-Mitch 0.3 MG/0.3ML Injection Solution Auto-injector ; 2; 0; 21-Nov-2014; Active     • esomeprazole (NexIUM) 40 MG capsule Take 1 capsule by mouth.     • estradiol (VIVELLE-DOT) 0.025 MG/24HR patch Place 1 patch on the skin as directed by provider 2 (Two) Times a Week.     • Fish Oil-Krill Oil (KRILL OIL PLUS) capsule Take  by mouth.     • gabapentin (NEURONTIN) 100 MG capsule 100 mg Daily.     • montelukast (SINGULAIR) 10 MG tablet Take  by mouth.     • Probiotic Product (ALIGN) capsule Take  by mouth.     • progesterone (PROMETRIUM) 200 MG capsule Take 200 mg by mouth Daily.     • rosuvastatin (Crestor) 5 MG tablet Take 1 tablet by mouth Daily. 90 tablet 3   • VENTOLIN  (90 BASE) MCG/ACT inhaler        Facility-Administered Medications Prior to Visit   Medication Dose Route Frequency Provider Last Rate Last Admin   • promethazine (PHENERGAN) tablet 25 mg  25 mg Oral Q8H PRN Nj Coto MD           No opioid medication identified on active medication list. I have reviewed chart for other potential  high risk medication/s and harmful drug interactions in the elderly.          Aspirin is on active medication list. Does not currently have an indication but while evaluaing dyspnea she will continue B-ASA. .      Patient Active Problem List   Diagnosis   • Mixed anxiety depressive disorder   • Hyperlipidemia   • Impaired fasting glucose   •  "Obesity (BMI 30-39.9)   • Nausea   • Hypersomnia with sleep apnea   • Asthma   • GERD (gastroesophageal reflux disease)   • Arthritis of left knee   • Essential hypertension     Advance Care Planning  Advance Directive is not on file.  ACP discussion was held with the patient during this visit. Patient does not have an advance directive, information provided.          Objective    Vitals:    11/08/21 0844   BP: 106/78   Pulse: 76   Resp: 16   Temp: 97.5 °F (36.4 °C)   Weight: 107 kg (235 lb)   Height: 168.9 cm (66.5\")     BMI Readings from Last 1 Encounters:   11/08/21 37.36 kg/m²   BMI is above normal parameters. Recommendations include: exercise counseling and nutrition counseling    Does the patient have evidence of cognitive impairment? No      Physical Exam  Vitals reviewed.   Constitutional:       Appearance: Normal appearance. She is well-developed. She is obese.   HENT:      Head: Normocephalic and atraumatic.   Cardiovascular:      Rate and Rhythm: Normal rate and regular rhythm.      Heart sounds: Normal heart sounds, S1 normal and S2 normal.   Pulmonary:      Effort: Pulmonary effort is normal.      Breath sounds: Normal breath sounds.   Musculoskeletal:      Cervical back: Neck supple.   Skin:     General: Skin is warm and dry.   Neurological:      Mental Status: She is alert.   Psychiatric:         Mood and Affect: Mood normal.         Behavior: Behavior normal.       Lab Results   Component Value Date    CHLPL 162 10/29/2021    TRIG 81 10/29/2021    HDL 74 10/29/2021    LDL 73 10/29/2021    VLDL 15 10/29/2021    HGBA1C 5.6 10/29/2021            HEALTH RISK ASSESSMENT    Smoking Status:  Social History     Tobacco Use   Smoking Status Former Smoker   Smokeless Tobacco Not on file     Alcohol Consumption:  Social History     Substance and Sexual Activity   Alcohol Use No     Fall Risk Screen:    STEADI Fall Risk Assessment was completed, and patient is at MODERATE risk for falls. Assessment completed " on:11/8/2021    Depression Screening:  PHQ-2/PHQ-9 Depression Screening 11/8/2021   Little interest or pleasure in doing things 0   Feeling down, depressed, or hopeless 0   Trouble falling or staying asleep, or sleeping too much -   Feeling tired or having little energy -   Poor appetite or overeating -   Feeling bad about yourself - or that you are a failure or have let yourself or your family down -   Trouble concentrating on things, such as reading the newspaper or watching television -   Moving or speaking so slowly that other people could have noticed. Or the opposite - being so fidgety or restless that you have been moving around a lot more than usual -   Thoughts that you would be better off dead, or of hurting yourself in some way -   Total Score 0   If you checked off any problems, how difficult have these problems made it for you to do your work, take care of things at home, or get along with other people? -   Little interest or pleasure in doing things -   Feeling down, depressed, or hopeless -   Trouble falling or staying asleep, or sleeping too much -   Feeling tired or having little energy -   Poor appetite or overeating -   Feeling bad about yourself - or that you are a failure or have let yourself or your family down -   Trouble concentrating on things, such as reading the newspaper or watching television -   Moving or speaking so slowly that other people could have noticed. Or the opposite - being so fidgety or restless that you have been moving around a lot more than usual -   Thoughts that you would be better off dead, or of hurting yourself in some way -   How difficult have these problems made it for you to do your work, take care of things at home, or get along with other people? -       Health Habits and Functional and Cognitive Screening:  Functional & Cognitive Status 11/8/2021   Do you have difficulty preparing food and eating? No   Do you have difficulty bathing yourself, getting dressed  or grooming yourself? No   Do you have difficulty using the toilet? No   Do you have difficulty moving around from place to place? No   Do you have trouble with steps or getting out of a bed or a chair? No   Current Diet Well Balanced Diet   Dental Exam Up to date   Eye Exam Up to date   Exercise (times per week) 7 times per week   Current Exercises Include Walking   Current Exercise Activities Include -   Do you need help using the phone?  No   Are you deaf or do you have serious difficulty hearing?  No   Do you need help with transportation? No   Do you need help shopping? No   Do you need help preparing meals?  No   Do you need help with housework?  No   Do you need help with laundry? No   Do you need help taking your medications? No   Do you need help managing money? No   Do you ever drive or ride in a car without wearing a seat belt? No   Have you felt unusual stress, anger or loneliness in the last month? No   Who do you live with? Alone   If you need help, do you have trouble finding someone available to you? No   Have you been bothered in the last four weeks by sexual problems? No       Age-appropriate Screening Schedule:  Refer to the list below for future screening recommendations based on patient's age, sex and/or medical conditions. Orders for these recommended tests are listed in the plan section. The patient has been provided with a written plan.    Health Maintenance   Topic Date Due   • DXA SCAN  12/31/2024 (Originally 9/16/2021)   • LIPID PANEL  10/29/2022   • TDAP/TD VACCINES (3 - Td or Tdap) 07/15/2031   • INFLUENZA VACCINE  Completed   • ZOSTER VACCINE  Completed   • MAMMOGRAM  Discontinued              Assessment/Plan   CMS Preventative Services Quick Reference  Risk Factors Identified During Encounter  Obesity/Overweight   The above risks/problems have been discussed with the patient.  Follow up actions/plans if indicated are seen below in the Assessment/Plan Section.  Pertinent information has  been shared with the patient in the After Visit Summary.      ECG 12 Lead    Date/Time: 11/8/2021 4:39 PM  Performed by: Keiry Means APRN  Authorized by: Keiry Means APRN   Previous ECG: no previous ECG available  Rhythm: sinus rhythm  Rate: normal  ST Elevation: aVR and aVL  T flattening: V2, V3 and V4  QRS axis: right    Clinical impression: abnormal EKG          Diagnoses and all orders for this visit:    1. Medicare annual wellness visit, subsequent (Primary)  Comments:  Living will encouraged. Continue weight loss via dietary changes and exercise. If cardiac evaulation negative then can D/C B-ASA.     2. Dyspnea on exertion  Comments:  Recent CXR negative. CBC normal. EKG today   Cardiolite stress test and 2D echo advised.   Orders:  -     Stress test with myocardial perfusion  -     Adult Transthoracic Echo Complete W/ Cont if Necessary Per Protocol  -     ECG 12 Lead    3. Abnormal EKG  -     Stress test with myocardial perfusion  -     Adult Transthoracic Echo Complete W/ Cont if Necessary Per Protocol    4. Essential hypertension  Comments:  Controlled.  Amlodione 5 mg daily    5. Impaired fasting glucose  Comments:  A1C 5.6% with normal glucose. Continue with weight loss.     6. Hyperlipidemia, unspecified hyperlipidemia type  Comments:  Stable. Continue Crestor 5 mg daily.     7. Obesity (BMI 30-39.9)  Comments:  Down 15#. Continue weight loss via dietary changes and 150 minutes weekly aerobic exercise.     8. Hypersomnia with sleep apnea  Comments:  CPAP checked a couple of months ago and has all new equipment.     9. Mild persistent asthma, unspecified whether complicated  Comments:  Followed by Dr. Frausto--controlled with Dupixent, Symbicort and rescue inhaler.     Records reviewed include previous OV with myself as well as labs.     53 minutes spent reviewing records searching for previous labs, imaging, cardiac evaluation as well as EKG (no hard copy found but  interpretations reviewed), evaluating patient as well as counseling and coordinating care.   Follow Up:   No follow-ups on file.     An After Visit Summary and PPPS were made available to the patient.

## 2021-11-08 ENCOUNTER — OFFICE VISIT (OUTPATIENT)
Dept: INTERNAL MEDICINE | Facility: CLINIC | Age: 76
End: 2021-11-08

## 2021-11-08 VITALS
BODY MASS INDEX: 36.88 KG/M2 | RESPIRATION RATE: 16 BRPM | HEIGHT: 67 IN | DIASTOLIC BLOOD PRESSURE: 78 MMHG | SYSTOLIC BLOOD PRESSURE: 106 MMHG | HEART RATE: 76 BPM | TEMPERATURE: 97.5 F | WEIGHT: 235 LBS

## 2021-11-08 DIAGNOSIS — G47.10 HYPERSOMNIA WITH SLEEP APNEA: Chronic | ICD-10-CM

## 2021-11-08 DIAGNOSIS — I10 ESSENTIAL HYPERTENSION: Chronic | ICD-10-CM

## 2021-11-08 DIAGNOSIS — R73.01 IMPAIRED FASTING GLUCOSE: Chronic | ICD-10-CM

## 2021-11-08 DIAGNOSIS — R94.31 ABNORMAL EKG: ICD-10-CM

## 2021-11-08 DIAGNOSIS — E78.5 HYPERLIPIDEMIA, UNSPECIFIED HYPERLIPIDEMIA TYPE: Chronic | ICD-10-CM

## 2021-11-08 DIAGNOSIS — J45.30 MILD PERSISTENT ASTHMA, UNSPECIFIED WHETHER COMPLICATED: Chronic | ICD-10-CM

## 2021-11-08 DIAGNOSIS — R06.09 DYSPNEA ON EXERTION: ICD-10-CM

## 2021-11-08 DIAGNOSIS — G47.30 HYPERSOMNIA WITH SLEEP APNEA: Chronic | ICD-10-CM

## 2021-11-08 DIAGNOSIS — E66.9 OBESITY (BMI 30-39.9): Chronic | ICD-10-CM

## 2021-11-08 DIAGNOSIS — Z00.00 MEDICARE ANNUAL WELLNESS VISIT, SUBSEQUENT: Primary | ICD-10-CM

## 2021-11-08 PROCEDURE — 93000 ELECTROCARDIOGRAM COMPLETE: CPT | Performed by: NURSE PRACTITIONER

## 2021-11-08 PROCEDURE — 1170F FXNL STATUS ASSESSED: CPT | Performed by: NURSE PRACTITIONER

## 2021-11-08 PROCEDURE — G0439 PPPS, SUBSEQ VISIT: HCPCS | Performed by: NURSE PRACTITIONER

## 2021-11-08 PROCEDURE — 1160F RVW MEDS BY RX/DR IN RCRD: CPT | Performed by: NURSE PRACTITIONER

## 2021-11-08 PROCEDURE — 99215 OFFICE O/P EST HI 40 MIN: CPT | Performed by: NURSE PRACTITIONER

## 2021-11-11 ENCOUNTER — TELEPHONE (OUTPATIENT)
Dept: INTERNAL MEDICINE | Facility: CLINIC | Age: 76
End: 2021-11-11

## 2021-11-11 NOTE — TELEPHONE ENCOUNTER
Caller: Luzmaria Pérez    Relationship to patient: Self    Best call back number: [phone]    Patient is needing: PATIENT IS CALLING TO MAKE SURE JOSEPH BEARD IS OK WITH HER HAVING THE STRESS TEST ON January 19-22, SHE STATES THAT IS THE SOONEST APPOINTMENT SHE COULD GET AND WANTED TO OKAY THAT WITH JOSEPH BEARD. SHE STATES THAT IF THAT IS TOO LONG SHE CAN GO SOMEWHERE ELSE. PLEASE ADVISE.

## 2021-12-03 ENCOUNTER — HOSPITAL ENCOUNTER (OUTPATIENT)
Dept: NUCLEAR MEDICINE | Facility: HOSPITAL | Age: 76
Discharge: HOME | End: 2021-12-03

## 2021-12-03 ENCOUNTER — HOSPITAL ENCOUNTER (OUTPATIENT)
Dept: CARDIOLOGY | Facility: HOSPITAL | Age: 76
Discharge: HOME | End: 2021-12-03

## 2021-12-03 VITALS
DIASTOLIC BLOOD PRESSURE: 78 MMHG | HEIGHT: 65 IN | BODY MASS INDEX: 39.15 KG/M2 | WEIGHT: 235 LBS | SYSTOLIC BLOOD PRESSURE: 171 MMHG

## 2021-12-03 PROCEDURE — 78452 HT MUSCLE IMAGE SPECT MULT: CPT | Performed by: INTERNAL MEDICINE

## 2021-12-03 PROCEDURE — 93306 TTE W/DOPPLER COMPLETE: CPT

## 2021-12-03 PROCEDURE — A9502 TC99M TETROFOSMIN: HCPCS | Performed by: NURSE PRACTITIONER

## 2021-12-03 PROCEDURE — 93017 CV STRESS TEST TRACING ONLY: CPT

## 2021-12-03 PROCEDURE — 93018 CV STRESS TEST I&R ONLY: CPT | Performed by: INTERNAL MEDICINE

## 2021-12-03 PROCEDURE — 78452 HT MUSCLE IMAGE SPECT MULT: CPT

## 2021-12-03 PROCEDURE — 25010000002 REGADENOSON 0.4 MG/5ML SOLUTION: Performed by: NURSE PRACTITIONER

## 2021-12-03 PROCEDURE — 93306 TTE W/DOPPLER COMPLETE: CPT | Performed by: INTERNAL MEDICINE

## 2021-12-03 PROCEDURE — 0 TECHNETIUM TETROFOSMIN KIT: Performed by: NURSE PRACTITIONER

## 2021-12-03 PROCEDURE — 93016 CV STRESS TEST SUPVJ ONLY: CPT | Performed by: INTERNAL MEDICINE

## 2021-12-03 RX ADMIN — REGADENOSON 0.4 MG: 0.08 INJECTION, SOLUTION INTRAVENOUS at 11:00

## 2021-12-03 RX ADMIN — TETROFOSMIN 1 DOSE: 1.38 INJECTION, POWDER, LYOPHILIZED, FOR SOLUTION INTRAVENOUS at 11:00

## 2021-12-03 RX ADMIN — TETROFOSMIN 1 DOSE: 1.38 INJECTION, POWDER, LYOPHILIZED, FOR SOLUTION INTRAVENOUS at 10:04

## 2021-12-05 LAB
BH CV NUCLEAR PRIOR STUDY: 3
BH CV REST NUCLEAR ISOTOPE DOSE: 6.5 MCI
BH CV STRESS BP STAGE 1: NORMAL
BH CV STRESS BP STAGE 2: NORMAL
BH CV STRESS BP STAGE 3: NORMAL
BH CV STRESS BP STAGE 4: NORMAL
BH CV STRESS COMMENTS STAGE 1: NORMAL
BH CV STRESS DOSE REGADENOSON STAGE 1: 0.4
BH CV STRESS DURATION MIN STAGE 1: 1
BH CV STRESS DURATION MIN STAGE 2: 1
BH CV STRESS DURATION MIN STAGE 3: 1
BH CV STRESS DURATION MIN STAGE 4: 1
BH CV STRESS DURATION SEC STAGE 2: 0
BH CV STRESS HR STAGE 1: 65
BH CV STRESS HR STAGE 2: 78
BH CV STRESS HR STAGE 3: 75
BH CV STRESS HR STAGE 4: 75
BH CV STRESS NUCLEAR ISOTOPE DOSE: 21.9 MCI
BH CV STRESS PROTOCOL 1: NORMAL
BH CV STRESS RECOVERY BP: NORMAL MMHG
BH CV STRESS RECOVERY HR: 69 BPM
BH CV STRESS STAGE 1: 1
BH CV STRESS STAGE 2: 2
BH CV STRESS STAGE 3: 3
BH CV STRESS STAGE 4: 4
LV EF NUC BP: 63 %
MAXIMAL PREDICTED HEART RATE: 145 BPM
PERCENT MAX PREDICTED HR: 56.55 %
STRESS BASELINE BP: NORMAL MMHG
STRESS BASELINE HR: 56 BPM
STRESS PERCENT HR: 67 %
STRESS POST PEAK BP: NORMAL MMHG
STRESS POST PEAK HR: 82 BPM
STRESS TARGET HR: 123 BPM

## 2021-12-07 LAB
BH CV ECHO MEAS - AO MAX PG (FULL): -0.28 MMHG
BH CV ECHO MEAS - AO MAX PG: 4.7 MMHG
BH CV ECHO MEAS - AO MEAN PG (FULL): 0.27 MMHG
BH CV ECHO MEAS - AO MEAN PG: 2.6 MMHG
BH CV ECHO MEAS - AO ROOT AREA (BSA CORRECTED): 1.3
BH CV ECHO MEAS - AO ROOT AREA: 6.3 CM^2
BH CV ECHO MEAS - AO ROOT DIAM: 2.8 CM
BH CV ECHO MEAS - AO V2 MAX: 108.5 CM/SEC
BH CV ECHO MEAS - AO V2 MEAN: 74.1 CM/SEC
BH CV ECHO MEAS - AO V2 VTI: 25.3 CM
BH CV ECHO MEAS - ASC AORTA: 2.9 CM
BH CV ECHO MEAS - AVA(I,A): 3.1 CM^2
BH CV ECHO MEAS - AVA(I,D): 3.1 CM^2
BH CV ECHO MEAS - AVA(V,A): 3.6 CM^2
BH CV ECHO MEAS - AVA(V,D): 3.6 CM^2
BH CV ECHO MEAS - BSA(HAYCOCK): 2.3 M^2
BH CV ECHO MEAS - BSA: 2.1 M^2
BH CV ECHO MEAS - BZI_BMI: 39.1 KILOGRAMS/M^2
BH CV ECHO MEAS - BZI_METRIC_HEIGHT: 165.1 CM
BH CV ECHO MEAS - BZI_METRIC_WEIGHT: 106.6 KG
BH CV ECHO MEAS - EDV(CUBED): 119.1 ML
BH CV ECHO MEAS - EDV(MOD-SP4): 64.8 ML
BH CV ECHO MEAS - EDV(TEICH): 113.9 ML
BH CV ECHO MEAS - EF(CUBED): 81.2 %
BH CV ECHO MEAS - EF(MOD-BP): 64 %
BH CV ECHO MEAS - EF(MOD-SP4): 63.8 %
BH CV ECHO MEAS - EF(TEICH): 73.6 %
BH CV ECHO MEAS - ESV(CUBED): 22.4 ML
BH CV ECHO MEAS - ESV(MOD-SP4): 23.4 ML
BH CV ECHO MEAS - ESV(TEICH): 30.1 ML
BH CV ECHO MEAS - FS: 42.7 %
BH CV ECHO MEAS - IVS/LVPW: 0.89
BH CV ECHO MEAS - IVSD: 0.95 CM
BH CV ECHO MEAS - LA DIMENSION(2D): 4.2 CM
BH CV ECHO MEAS - LV DIASTOLIC VOL/BSA (35-75): 30.6 ML/M^2
BH CV ECHO MEAS - LV MASS(C)D: 178.3 GRAMS
BH CV ECHO MEAS - LV MASS(C)DI: 84.2 GRAMS/M^2
BH CV ECHO MEAS - LV MAX PG: 5 MMHG
BH CV ECHO MEAS - LV MEAN PG: 2.3 MMHG
BH CV ECHO MEAS - LV SYSTOLIC VOL/BSA (12-30): 11.1 ML/M^2
BH CV ECHO MEAS - LV V1 MAX: 111.7 CM/SEC
BH CV ECHO MEAS - LV V1 MEAN: 69.7 CM/SEC
BH CV ECHO MEAS - LV V1 VTI: 22.8 CM
BH CV ECHO MEAS - LVIDD: 4.9 CM
BH CV ECHO MEAS - LVIDS: 2.8 CM
BH CV ECHO MEAS - LVOT AREA: 3.5 CM^2
BH CV ECHO MEAS - LVOT DIAM: 2.1 CM
BH CV ECHO MEAS - LVPWD: 1.1 CM
BH CV ECHO MEAS - MV A MAX VEL: 86.9 CM/SEC
BH CV ECHO MEAS - MV DEC SLOPE: 350.6 CM/SEC^2
BH CV ECHO MEAS - MV DEC TIME: 0.22 SEC
BH CV ECHO MEAS - MV E MAX VEL: 76.1 CM/SEC
BH CV ECHO MEAS - MV E/A: 0.88
BH CV ECHO MEAS - MV MAX PG: 4.3 MMHG
BH CV ECHO MEAS - MV MEAN PG: 1.4 MMHG
BH CV ECHO MEAS - MV V2 MAX: 103.7 CM/SEC
BH CV ECHO MEAS - MV V2 MEAN: 54.9 CM/SEC
BH CV ECHO MEAS - MV V2 VTI: 32.4 CM
BH CV ECHO MEAS - MVA(VTI): 2.4 CM^2
BH CV ECHO MEAS - PA ACC TIME: 0.09 SEC
BH CV ECHO MEAS - PA MAX PG (FULL): 1.5 MMHG
BH CV ECHO MEAS - PA MAX PG: 3.2 MMHG
BH CV ECHO MEAS - PA MEAN PG (FULL): 0.93 MMHG
BH CV ECHO MEAS - PA MEAN PG: 1.8 MMHG
BH CV ECHO MEAS - PA PR(ACCEL): 39.6 MMHG
BH CV ECHO MEAS - PA V2 MAX: 89.6 CM/SEC
BH CV ECHO MEAS - PA V2 MEAN: 63.6 CM/SEC
BH CV ECHO MEAS - PA V2 VTI: 22.3 CM
BH CV ECHO MEAS - PULM A REVS DUR: 0.15 SEC
BH CV ECHO MEAS - PULM A REVS VEL: 36.3 CM/SEC
BH CV ECHO MEAS - PULM DIAS VEL: 49.4 CM/SEC
BH CV ECHO MEAS - PULM S/D: 1.7
BH CV ECHO MEAS - PULM SYS VEL: 82.8 CM/SEC
BH CV ECHO MEAS - PVA(I,A): 2 CM^2
BH CV ECHO MEAS - PVA(I,D): 2 CM^2
BH CV ECHO MEAS - PVA(V,A): 2.1 CM^2
BH CV ECHO MEAS - PVA(V,D): 2.1 CM^2
BH CV ECHO MEAS - QP/QS: 0.57
BH CV ECHO MEAS - RAP SYSTOLE: 3 MMHG
BH CV ECHO MEAS - RV MAX PG: 1.7 MMHG
BH CV ECHO MEAS - RV MEAN PG: 0.91 MMHG
BH CV ECHO MEAS - RV V1 MAX: 64.6 CM/SEC
BH CV ECHO MEAS - RV V1 MEAN: 45 CM/SEC
BH CV ECHO MEAS - RV V1 VTI: 15.6 CM
BH CV ECHO MEAS - RVDD: 2.8 CM
BH CV ECHO MEAS - RVOT AREA: 2.9 CM^2
BH CV ECHO MEAS - RVOT DIAM: 1.9 CM
BH CV ECHO MEAS - RVSP: 12.6 MMHG
BH CV ECHO MEAS - SI(AO): 75.7 ML/M^2
BH CV ECHO MEAS - SI(CUBED): 45.7 ML/M^2
BH CV ECHO MEAS - SI(LVOT): 37.5 ML/M^2
BH CV ECHO MEAS - SI(MOD-SP4): 19.5 ML/M^2
BH CV ECHO MEAS - SI(TEICH): 39.6 ML/M^2
BH CV ECHO MEAS - SV(AO): 160.5 ML
BH CV ECHO MEAS - SV(CUBED): 96.7 ML
BH CV ECHO MEAS - SV(LVOT): 79.4 ML
BH CV ECHO MEAS - SV(MOD-SP4): 41.4 ML
BH CV ECHO MEAS - SV(RVOT): 45 ML
BH CV ECHO MEAS - SV(TEICH): 83.8 ML
BH CV ECHO MEAS - TR MAX VEL: 155.1 CM/SEC

## 2022-01-19 ENCOUNTER — APPOINTMENT (OUTPATIENT)
Dept: NUCLEAR MEDICINE | Facility: HOSPITAL | Age: 77
End: 2022-01-19

## 2022-01-19 ENCOUNTER — APPOINTMENT (OUTPATIENT)
Dept: CARDIOLOGY | Facility: HOSPITAL | Age: 77
End: 2022-01-19

## 2022-05-02 ENCOUNTER — TELEPHONE (OUTPATIENT)
Dept: INTERNAL MEDICINE | Facility: CLINIC | Age: 77
End: 2022-05-02

## 2022-05-02 NOTE — TELEPHONE ENCOUNTER
Caller: Luzmaria Pérez    Relationship: Self    Best call back number: [phone]    What was the call regarding: PATIENT CALLED TO ASK IF JOSEPH WOULD TAKE HER GRANDDAUGHTER ON AS A NEW PATIENT. SHE RECENTLY LOST HER MOTHER AND HAS NOT BEEN FEELING GOOD, SHE HAS HAD HEADACHES AND HAS BEEN DIZZY. SHE IS 20 YEARS OLD. PLEASE ADVISE.    Do you require a callback: YES

## 2022-05-09 ENCOUNTER — OFFICE VISIT (OUTPATIENT)
Dept: INTERNAL MEDICINE | Facility: CLINIC | Age: 77
End: 2022-05-09

## 2022-05-09 VITALS
TEMPERATURE: 97.5 F | BODY MASS INDEX: 40.32 KG/M2 | RESPIRATION RATE: 20 BRPM | DIASTOLIC BLOOD PRESSURE: 70 MMHG | WEIGHT: 242 LBS | HEART RATE: 88 BPM | HEIGHT: 65 IN | SYSTOLIC BLOOD PRESSURE: 110 MMHG

## 2022-05-09 DIAGNOSIS — I10 ESSENTIAL HYPERTENSION: Primary | Chronic | ICD-10-CM

## 2022-05-09 DIAGNOSIS — E66.9 OBESITY (BMI 30-39.9): Chronic | ICD-10-CM

## 2022-05-09 DIAGNOSIS — E78.5 HYPERLIPIDEMIA, UNSPECIFIED HYPERLIPIDEMIA TYPE: ICD-10-CM

## 2022-05-09 DIAGNOSIS — R73.01 IMPAIRED FASTING GLUCOSE: ICD-10-CM

## 2022-05-09 DIAGNOSIS — Z12.11 COLON CANCER SCREENING: ICD-10-CM

## 2022-05-09 PROCEDURE — 99214 OFFICE O/P EST MOD 30 MIN: CPT | Performed by: NURSE PRACTITIONER

## 2022-05-09 RX ORDER — CYCLOSPORINE 0.5 MG/ML
1 EMULSION OPHTHALMIC 2 TIMES DAILY
COMMUNITY

## 2022-05-09 NOTE — PROGRESS NOTES
Subjective   Chief Complaint   Patient presents with   • Hypertension       History of Present Illness   76 y.o. female presents for follow up regarding HTN and obesity. Denies chest pain. Dyspnea stable--Dupixent working well for her. Seeing Dr. Frausto in August.      Patient Active Problem List   Diagnosis   • Mixed anxiety depressive disorder   • Hyperlipidemia   • Impaired fasting glucose   • Obesity (BMI 30-39.9)   • Nausea   • Hypersomnia with sleep apnea   • Asthma   • GERD (gastroesophageal reflux disease)   • Arthritis of left knee   • Essential hypertension       Allergies   Allergen Reactions   • Adhesive Tape Hives   • Pantoprazole Nausea Only     Other reaction(s): Headache   • Penicillins Hives   • Nickel Rash     With cheap jewelry   • Troleandomycin Hives       Current Outpatient Medications on File Prior to Visit   Medication Sig Dispense Refill   • ALPRAZolam (XANAX) 0.5 MG tablet Take 1 tablet by mouth.     • amLODIPine (NORVASC) 5 MG tablet Take 1 tablet by mouth Daily. 90 tablet 1   • Ascorbic Acid (VITAMIN C) 500 MG capsule Take  by mouth.     • Azelastine-Fluticasone 137-50 MCG/ACT suspension      • Biotin 1000 MCG tablet Take  by mouth.     • budesonide-formoterol (SYMBICORT) 160-4.5 MCG/ACT inhaler Symbicort 160-4.5 MCG/ACT Inhalation Aerosol; Patient Sig: Symbicort 160-4.5 MCG/ACT Inhalation Aerosol ; 10; 0; 28-Jul-2015; Active     • buPROPion XL (WELLBUTRIN XL) 150 MG 24 hr tablet      • Cholecalciferol (VITAMIN D) 2000 UNITS capsule Take  by mouth.     • citalopram (CeleXA) 20 MG tablet      • cycloSPORINE (RESTASIS) 0.05 % ophthalmic emulsion 1 drop 2 (Two) Times a Day.     • Dupixent 300 MG/2ML solution pen-injector      • EPINEPHrine (EPIPEN) 0.3 MG/0.3ML solution auto-injector injection EpiPen 2-Mitch 0.3 MG/0.3ML Injection Solution Auto-injector; Patient Sig: EpiPen 2-Mitch 0.3 MG/0.3ML Injection Solution Auto-injector ; 2; 0; 21-Nov-2014; Active     • esomeprazole (NexIUM) 40 MG  capsule Take 1 capsule by mouth.     • estradiol (VIVELLE-DOT) 0.025 MG/24HR patch Place 1 patch on the skin as directed by provider 2 (Two) Times a Week.     • Fish Oil-Krill Oil (KRILL OIL PLUS) capsule Take  by mouth.     • gabapentin (NEURONTIN) 100 MG capsule 100 mg Daily.     • montelukast (SINGULAIR) 10 MG tablet Take  by mouth.     • Probiotic Product (ALIGN) capsule Take  by mouth.     • progesterone (PROMETRIUM) 200 MG capsule Take 200 mg by mouth Daily.     • rosuvastatin (Crestor) 5 MG tablet Take 1 tablet by mouth Daily. 90 tablet 3   • VENTOLIN  (90 BASE) MCG/ACT inhaler        Current Facility-Administered Medications on File Prior to Visit   Medication Dose Route Frequency Provider Last Rate Last Admin   • promethazine (PHENERGAN) tablet 25 mg  25 mg Oral Q8H PRN Nj Coto MD           Past Medical History:   Diagnosis Date   • Achilles tendinitis of right lower extremity    • Esophageal stricture    • GERD (gastroesophageal reflux disease)    • Hemorrhoids 11/24/2015   • Hyperlipidemia    • Impaired fasting glucose 11/24/2015    Description: NEW JO 09/14   • Inflammatory fibroid polyps of stomach        Family History   Problem Relation Age of Onset   • Depression Mother    • Depression Brother    • Hypertension Daughter    • Hypertension Son        Social History     Socioeconomic History   • Marital status:    Tobacco Use   • Smoking status: Former Smoker   Substance and Sexual Activity   • Alcohol use: No   • Drug use: No   • Sexual activity: Never       Past Surgical History:   Procedure Laterality Date   • BREAST SURGERY     • CHOLECYSTECTOMY     • COLONOSCOPY     • INCISIONAL BREAST BIOPSY     • OOPHORECTOMY     • REPLACEMENT TOTAL KNEE Left    • TONSILLECTOMY AND ADENOIDECTOMY     • TOTAL SHOULDER ARTHROPLASTY         The following portions of the patient's history were reviewed and updated as appropriate: problem list, allergies, current medications, past medical  "history and past social history.    Review of Systems    Immunization History   Administered Date(s) Administered   • COVID-19 (PFIZER) PURPLE CAP 02/04/2021, 02/25/2021, 04/11/2021   • FLUAD TRI 65YR+ 10/06/2020   • Flu Vaccine Quad PF >36MO 09/18/2018   • Fluzone High Dose =>65 Years ([employer] ONLY) 10/01/2015, 09/18/2018, 10/08/2019   • Fluzone High-Dose 65+yrs 08/25/2021   • Hepatitis A 04/26/2018, 11/02/2018   • Influenza Quad Vaccine (Inpatient) 09/20/2017   • Influenza TIV (IM) 09/20/2017   • Influenza, Unspecified 10/15/2015   • Pneumococcal Conjugate 13-Valent (PCV13) 09/28/2015   • Pneumococcal Polysaccharide (PPSV23) 10/19/2020   • Shingrix 10/30/2019, 10/06/2020, 10/23/2020   • TD Preservative Free 07/15/2021   • Td, Unspecified 08/15/2006   • Tdap 09/06/2016   • Zostavax 02/15/2007       Objective   Vitals:    05/09/22 0822   BP: 110/70   Pulse: 88   Resp: 20   Temp: 97.5 °F (36.4 °C)   Weight: 110 kg (242 lb)   Height: 165.1 cm (65\")     Body mass index is 40.27 kg/m².  Physical Exam  Vitals reviewed.   Constitutional:       Appearance: Normal appearance. She is well-developed. She is obese.   HENT:      Head: Normocephalic and atraumatic.   Cardiovascular:      Rate and Rhythm: Normal rate and regular rhythm.      Heart sounds: Normal heart sounds, S1 normal and S2 normal.   Pulmonary:      Effort: Pulmonary effort is normal.      Breath sounds: Normal breath sounds.   Skin:     General: Skin is warm and dry.   Neurological:      Mental Status: She is alert.   Psychiatric:         Mood and Affect: Mood normal.         Behavior: Behavior normal.         Procedures    Assessment/Plan   Diagnoses and all orders for this visit:    1. Essential hypertension (Primary)  Comments:  Controlled. Continue current medications and RTO in 6 months with labs prior.   Orders:  -     Comprehensive Metabolic Panel; Future    2. Obesity (BMI 30-39.9)  Comments:  Unforunately is up 7#. Encouraged 12 # weight loss prior to " 6 month follow up visit. Dietary changes and 150 minutes weekly aerobic exercise advised.     3. Hyperlipidemia, unspecified hyperlipidemia type  -     Comprehensive Metabolic Panel; Future  -     Lipid Panel With / Chol / HDL Ratio; Future    4. Impaired fasting glucose  -     Hemoglobin A1c; Future    5. Colon cancer screening  Comments:  Schedule with Dr. Hernández.   Orders:  -     Ambulatory Referral For Screening Colonoscopy    Records reviewed include previous OV with myself as well as labs.     Return in about 6 months (around 11/9/2022) for Medicare Wellness.

## 2022-05-13 DIAGNOSIS — I10 ESSENTIAL HYPERTENSION: Chronic | ICD-10-CM

## 2022-05-13 RX ORDER — AMLODIPINE BESYLATE 5 MG/1
TABLET ORAL
Qty: 90 TABLET | Refills: 1 | Status: SHIPPED | OUTPATIENT
Start: 2022-05-13 | End: 2022-05-16 | Stop reason: SDUPTHER

## 2022-05-16 DIAGNOSIS — I10 ESSENTIAL HYPERTENSION: Chronic | ICD-10-CM

## 2022-05-16 RX ORDER — AMLODIPINE BESYLATE 5 MG/1
5 TABLET ORAL DAILY
Qty: 90 TABLET | Refills: 1 | Status: SHIPPED | OUTPATIENT
Start: 2022-05-16 | End: 2022-12-01 | Stop reason: SDUPTHER

## 2022-06-14 DIAGNOSIS — E78.5 HYPERLIPIDEMIA, UNSPECIFIED HYPERLIPIDEMIA TYPE: ICD-10-CM

## 2022-06-14 RX ORDER — ROSUVASTATIN CALCIUM 5 MG/1
TABLET, COATED ORAL
Qty: 90 TABLET | Refills: 3 | Status: SHIPPED | OUTPATIENT
Start: 2022-06-14 | End: 2022-12-01 | Stop reason: SDUPTHER

## 2022-09-08 NOTE — PROGRESS NOTES
Subjective   Chief Complaint   Patient presents with   • Edema       History of Present Illness   77 y.o. female presents with cc lower extremity edema ongoing for 3 weeks. Reports swelling around her ankles as the day goes on; always better in the morning. Denies CP or dyspnea. Continues amlodipine for HTN. Also on Gabapentin. Echo last year with EF 64%.      Patient Active Problem List   Diagnosis   • Mixed anxiety depressive disorder   • Hyperlipidemia   • Impaired fasting glucose   • Obesity (BMI 30-39.9)   • Nausea   • Hypersomnia with sleep apnea   • Asthma   • GERD (gastroesophageal reflux disease)   • Arthritis of left knee   • Essential hypertension       Allergies   Allergen Reactions   • Adhesive Tape Hives   • Pantoprazole Nausea Only     Other reaction(s): Headache   • Penicillins Hives   • Nickel Rash     With cheap jewelry   • Troleandomycin Hives       Current Outpatient Medications on File Prior to Visit   Medication Sig Dispense Refill   • ALPRAZolam (XANAX) 0.5 MG tablet Take 1 tablet by mouth.     • amLODIPine (NORVASC) 5 MG tablet Take 1 tablet by mouth Daily. 90 tablet 1   • Ascorbic Acid (VITAMIN C) 500 MG capsule Take  by mouth.     • Azelastine-Fluticasone 137-50 MCG/ACT suspension      • Biotin 1000 MCG tablet Take  by mouth.     • budesonide-formoterol (SYMBICORT) 160-4.5 MCG/ACT inhaler Symbicort 160-4.5 MCG/ACT Inhalation Aerosol; Patient Sig: Symbicort 160-4.5 MCG/ACT Inhalation Aerosol ; 10; 0; 28-Jul-2015; Active     • buPROPion XL (WELLBUTRIN XL) 150 MG 24 hr tablet      • Cholecalciferol (VITAMIN D) 2000 UNITS capsule Take  by mouth.     • citalopram (CeleXA) 20 MG tablet      • cycloSPORINE (RESTASIS) 0.05 % ophthalmic emulsion 1 drop 2 (Two) Times a Day.     • Dupixent 300 MG/2ML solution pen-injector      • EPINEPHrine (EPIPEN) 0.3 MG/0.3ML solution auto-injector injection EpiPen 2-Mitch 0.3 MG/0.3ML Injection Solution Auto-injector; Patient Sig: EpiPen 2-Mitch 0.3 MG/0.3ML Injection  Solution Auto-injector ; 2; 0; 21-Nov-2014; Active     • esomeprazole (NexIUM) 40 MG capsule Take 1 capsule by mouth.     • estradiol (VIVELLE-DOT) 0.025 MG/24HR patch Place 1 patch on the skin as directed by provider 2 (Two) Times a Week.     • Fish Oil-Krill Oil (KRILL OIL PLUS) capsule Take  by mouth.     • gabapentin (NEURONTIN) 100 MG capsule 100 mg Daily.     • montelukast (SINGULAIR) 10 MG tablet Take  by mouth.     • Probiotic Product (ALIGN) capsule Take  by mouth.     • progesterone (PROMETRIUM) 200 MG capsule Take 200 mg by mouth Daily.     • rosuvastatin (CRESTOR) 5 MG tablet TAKE 1 TABLET DAILY 90 tablet 3   • VENTOLIN  (90 BASE) MCG/ACT inhaler        Current Facility-Administered Medications on File Prior to Visit   Medication Dose Route Frequency Provider Last Rate Last Admin   • promethazine (PHENERGAN) tablet 25 mg  25 mg Oral Q8H PRN Nj Coto MD           Past Medical History:   Diagnosis Date   • Achilles tendinitis of right lower extremity    • Esophageal stricture    • GERD (gastroesophageal reflux disease)    • Hemorrhoids 11/24/2015   • Hyperlipidemia    • Impaired fasting glucose 11/24/2015    Description: NEW DXZ 09/14   • Inflammatory fibroid polyps of stomach        Family History   Problem Relation Age of Onset   • Depression Mother    • Depression Brother    • Hypertension Daughter    • Hypertension Son        Social History     Socioeconomic History   • Marital status:    Tobacco Use   • Smoking status: Former Smoker   Substance and Sexual Activity   • Alcohol use: No   • Drug use: No   • Sexual activity: Never       Past Surgical History:   Procedure Laterality Date   • BREAST SURGERY     • CHOLECYSTECTOMY     • COLONOSCOPY     • INCISIONAL BREAST BIOPSY     • OOPHORECTOMY     • REPLACEMENT TOTAL KNEE Left    • TONSILLECTOMY AND ADENOIDECTOMY     • TOTAL SHOULDER ARTHROPLASTY         The following portions of the patient's history were reviewed and updated as  "appropriate: problem list, allergies, current medications, past medical history and past social history.    Review of Systems    Immunization History   Administered Date(s) Administered   • COVID-19 (PFIZER) PURPLE CAP 02/04/2021, 02/25/2021, 04/11/2021   • Covid-19 (Pfizer) Gray Cap 05/16/2022   • FLUAD TRI 65YR+ 10/06/2020   • Flu Vaccine Quad PF >36MO 09/18/2018   • Fluzone High Dose =>65 Years ([employer] ONLY) 10/01/2015, 09/18/2018, 10/08/2019   • Fluzone High-Dose 65+yrs 08/25/2021   • Hepatitis A 04/26/2018, 11/02/2018   • Influenza Quad Vaccine (Inpatient) 09/20/2017   • Influenza TIV (IM) 09/20/2017   • Influenza, Unspecified 10/15/2015   • Pneumococcal Conjugate 13-Valent (PCV13) 09/28/2015   • Pneumococcal Polysaccharide (PPSV23) 10/19/2020   • Shingrix 10/30/2019, 10/06/2020, 10/23/2020   • TD Preservative Free 07/15/2021   • Td, Unspecified 08/15/2006   • Tdap 09/06/2016   • Zostavax 02/15/2007       Objective   Vitals:    09/09/22 1103   BP: 130/78   Pulse: 76   Resp: 14   Temp: 97.1 °F (36.2 °C)   Weight: 112 kg (247 lb)   Height: 165.1 cm (65\")     Body mass index is 41.1 kg/m².  Physical Exam  Vitals reviewed.   Constitutional:       Appearance: Normal appearance. She is well-developed.   HENT:      Head: Normocephalic and atraumatic.   Cardiovascular:      Rate and Rhythm: Normal rate and regular rhythm.      Heart sounds: Normal heart sounds, S1 normal and S2 normal.   Pulmonary:      Effort: Pulmonary effort is normal.      Breath sounds: Normal breath sounds.   Musculoskeletal:      Right lower leg: No edema.      Left lower leg: No edema.   Skin:     General: Skin is warm and dry.   Neurological:      Mental Status: She is alert.   Psychiatric:         Mood and Affect: Mood normal.         Behavior: Behavior normal.         Procedures    Assessment & Plan   Diagnoses and all orders for this visit:    1. Bilateral lower extremity edema (Primary)  Comments:  Not noted on exam. Labs as below. RX for " compression stockings provided. Marcello MDM pending lab results.   Orders:  -     Comprehensive Metabolic Panel  -     TSH  -     Urinalysis With Culture If Indicated - Urine, Clean Catch    2. Essential hypertension  Comments:  Stable.     Records reviewed include previous OV with myself as well as labs echo and stress testing in 2021.       Return for Lab Today, Next scheduled follow up.

## 2022-09-09 ENCOUNTER — OFFICE VISIT (OUTPATIENT)
Dept: INTERNAL MEDICINE | Facility: CLINIC | Age: 77
End: 2022-09-09

## 2022-09-09 VITALS
WEIGHT: 247 LBS | BODY MASS INDEX: 41.15 KG/M2 | SYSTOLIC BLOOD PRESSURE: 130 MMHG | TEMPERATURE: 97.1 F | RESPIRATION RATE: 14 BRPM | HEART RATE: 76 BPM | DIASTOLIC BLOOD PRESSURE: 78 MMHG | HEIGHT: 65 IN

## 2022-09-09 DIAGNOSIS — R60.0 BILATERAL LOWER EXTREMITY EDEMA: Primary | ICD-10-CM

## 2022-09-09 DIAGNOSIS — I10 ESSENTIAL HYPERTENSION: Chronic | ICD-10-CM

## 2022-09-09 PROCEDURE — 99213 OFFICE O/P EST LOW 20 MIN: CPT | Performed by: NURSE PRACTITIONER

## 2022-09-14 LAB
ALBUMIN SERPL-MCNC: 4 G/DL (ref 3.7–4.7)
ALBUMIN/GLOB SERPL: 1.7 {RATIO} (ref 1.2–2.2)
ALP SERPL-CCNC: 72 IU/L (ref 44–121)
ALT SERPL-CCNC: 13 IU/L (ref 0–32)
APPEARANCE UR: ABNORMAL
AST SERPL-CCNC: 19 IU/L (ref 0–40)
BACTERIA #/AREA URNS HPF: ABNORMAL /[HPF]
BACTERIA UR CULT: ABNORMAL
BACTERIA UR CULT: ABNORMAL
BILIRUB SERPL-MCNC: 0.6 MG/DL (ref 0–1.2)
BILIRUB UR QL STRIP: NEGATIVE
BUN SERPL-MCNC: 9 MG/DL (ref 8–27)
BUN/CREAT SERPL: 11 (ref 12–28)
CALCIUM SERPL-MCNC: 9.1 MG/DL (ref 8.7–10.3)
CASTS URNS QL MICRO: ABNORMAL /LPF
CHLORIDE SERPL-SCNC: 103 MMOL/L (ref 96–106)
CO2 SERPL-SCNC: 24 MMOL/L (ref 20–29)
COLOR UR: YELLOW
CREAT SERPL-MCNC: 0.83 MG/DL (ref 0.57–1)
EGFRCR-CYS SERPLBLD CKD-EPI 2021: 73 ML/MIN/1.73
EPI CELLS #/AREA URNS HPF: >10 /HPF (ref 0–10)
GLOBULIN SER CALC-MCNC: 2.4 G/DL (ref 1.5–4.5)
GLUCOSE SERPL-MCNC: 108 MG/DL (ref 65–99)
GLUCOSE UR QL STRIP: NEGATIVE
HGB UR QL STRIP: NEGATIVE
KETONES UR QL STRIP: NEGATIVE
LEUKOCYTE ESTERASE UR QL STRIP: ABNORMAL
MICRO URNS: ABNORMAL
NITRITE UR QL STRIP: NEGATIVE
OTHER ANTIBIOTIC SUSC ISLT: ABNORMAL
PH UR STRIP: 6 [PH] (ref 5–7.5)
POTASSIUM SERPL-SCNC: 4.2 MMOL/L (ref 3.5–5.2)
PROT SERPL-MCNC: 6.4 G/DL (ref 6–8.5)
PROT UR QL STRIP: ABNORMAL
RBC #/AREA URNS HPF: ABNORMAL /HPF (ref 0–2)
SODIUM SERPL-SCNC: 142 MMOL/L (ref 134–144)
SP GR UR STRIP: 1.02 (ref 1–1.03)
TSH SERPL DL<=0.005 MIU/L-ACNC: 4.99 UIU/ML (ref 0.45–4.5)
URINALYSIS REFLEX: ABNORMAL
UROBILINOGEN UR STRIP-MCNC: 1 MG/DL (ref 0.2–1)
WBC #/AREA URNS HPF: ABNORMAL /HPF (ref 0–5)

## 2022-09-15 DIAGNOSIS — E03.9 HYPOTHYROIDISM, UNSPECIFIED TYPE: ICD-10-CM

## 2022-09-15 DIAGNOSIS — R79.89 ELEVATED TSH: Primary | ICD-10-CM

## 2022-09-15 DIAGNOSIS — N30.00 ACUTE CYSTITIS WITHOUT HEMATURIA: Primary | ICD-10-CM

## 2022-09-15 RX ORDER — NITROFURANTOIN 25; 75 MG/1; MG/1
100 CAPSULE ORAL 2 TIMES DAILY
Qty: 10 CAPSULE | Refills: 0 | Status: SHIPPED | OUTPATIENT
Start: 2022-09-15 | End: 2022-11-27

## 2022-11-01 DIAGNOSIS — I10 ESSENTIAL HYPERTENSION: Chronic | ICD-10-CM

## 2022-11-01 DIAGNOSIS — R79.89 ELEVATED TSH: ICD-10-CM

## 2022-11-01 DIAGNOSIS — E03.9 HYPOTHYROIDISM, UNSPECIFIED TYPE: ICD-10-CM

## 2022-11-01 DIAGNOSIS — R73.01 IMPAIRED FASTING GLUCOSE: ICD-10-CM

## 2022-11-01 DIAGNOSIS — E78.5 HYPERLIPIDEMIA, UNSPECIFIED HYPERLIPIDEMIA TYPE: ICD-10-CM

## 2022-11-03 ENCOUNTER — FLU SHOT (OUTPATIENT)
Dept: INTERNAL MEDICINE | Facility: CLINIC | Age: 77
End: 2022-11-03

## 2022-11-03 DIAGNOSIS — Z23 NEED FOR INFLUENZA VACCINATION: Primary | ICD-10-CM

## 2022-11-03 LAB
ALBUMIN SERPL-MCNC: 4.2 G/DL (ref 3.5–5.2)
ALBUMIN/GLOB SERPL: 2.3 G/DL
ALP SERPL-CCNC: 74 U/L (ref 39–117)
ALT SERPL-CCNC: 12 U/L (ref 1–33)
AST SERPL-CCNC: 17 U/L (ref 1–32)
BILIRUB SERPL-MCNC: 0.6 MG/DL (ref 0–1.2)
BUN SERPL-MCNC: 11 MG/DL (ref 8–23)
BUN/CREAT SERPL: 13.4 (ref 7–25)
CALCIUM SERPL-MCNC: 9.4 MG/DL (ref 8.6–10.5)
CHLORIDE SERPL-SCNC: 105 MMOL/L (ref 98–107)
CHOLEST SERPL-MCNC: 184 MG/DL (ref 0–200)
CHOLEST/HDLC SERPL: 2.42 {RATIO}
CO2 SERPL-SCNC: 26.3 MMOL/L (ref 22–29)
CREAT SERPL-MCNC: 0.82 MG/DL (ref 0.57–1)
EGFRCR SERPLBLD CKD-EPI 2021: 74.2 ML/MIN/1.73
GLOBULIN SER CALC-MCNC: 1.8 GM/DL
GLUCOSE SERPL-MCNC: 100 MG/DL (ref 65–99)
HBA1C MFR BLD: 5.8 % (ref 4.8–5.6)
HDLC SERPL-MCNC: 76 MG/DL (ref 40–60)
LDLC SERPL CALC-MCNC: 87 MG/DL (ref 0–100)
POTASSIUM SERPL-SCNC: 4.4 MMOL/L (ref 3.5–5.2)
PROT SERPL-MCNC: 6 G/DL (ref 6–8.5)
SODIUM SERPL-SCNC: 139 MMOL/L (ref 136–145)
T4 FREE SERPL-MCNC: 0.97 NG/DL (ref 0.93–1.7)
TRIGL SERPL-MCNC: 120 MG/DL (ref 0–150)
TSH SERPL DL<=0.005 MIU/L-ACNC: 5.65 UIU/ML (ref 0.27–4.2)
VLDLC SERPL CALC-MCNC: 21 MG/DL (ref 5–40)

## 2022-11-03 PROCEDURE — 90662 IIV NO PRSV INCREASED AG IM: CPT | Performed by: INTERNAL MEDICINE

## 2022-11-03 PROCEDURE — G0008 ADMIN INFLUENZA VIRUS VAC: HCPCS | Performed by: INTERNAL MEDICINE

## 2022-12-01 ENCOUNTER — OFFICE VISIT (OUTPATIENT)
Dept: INTERNAL MEDICINE | Facility: CLINIC | Age: 77
End: 2022-12-01

## 2022-12-01 VITALS
TEMPERATURE: 98.2 F | HEIGHT: 65 IN | DIASTOLIC BLOOD PRESSURE: 80 MMHG | BODY MASS INDEX: 39.99 KG/M2 | WEIGHT: 240 LBS | SYSTOLIC BLOOD PRESSURE: 122 MMHG | HEART RATE: 84 BPM | RESPIRATION RATE: 16 BRPM

## 2022-12-01 DIAGNOSIS — I10 ESSENTIAL HYPERTENSION: Chronic | ICD-10-CM

## 2022-12-01 DIAGNOSIS — Z12.11 COLON CANCER SCREENING: ICD-10-CM

## 2022-12-01 DIAGNOSIS — E03.8 SUBCLINICAL HYPOTHYROIDISM: ICD-10-CM

## 2022-12-01 DIAGNOSIS — E78.5 HYPERLIPIDEMIA, UNSPECIFIED HYPERLIPIDEMIA TYPE: Chronic | ICD-10-CM

## 2022-12-01 DIAGNOSIS — R73.03 PREDIABETES: Chronic | ICD-10-CM

## 2022-12-01 DIAGNOSIS — E66.9 OBESITY (BMI 30-39.9): Chronic | ICD-10-CM

## 2022-12-01 DIAGNOSIS — Z00.00 MEDICARE ANNUAL WELLNESS VISIT, SUBSEQUENT: Primary | ICD-10-CM

## 2022-12-01 PROCEDURE — 99214 OFFICE O/P EST MOD 30 MIN: CPT | Performed by: NURSE PRACTITIONER

## 2022-12-01 PROCEDURE — 1159F MED LIST DOCD IN RCRD: CPT | Performed by: NURSE PRACTITIONER

## 2022-12-01 PROCEDURE — G0439 PPPS, SUBSEQ VISIT: HCPCS | Performed by: NURSE PRACTITIONER

## 2022-12-01 PROCEDURE — 1170F FXNL STATUS ASSESSED: CPT | Performed by: NURSE PRACTITIONER

## 2022-12-01 PROCEDURE — 1160F RVW MEDS BY RX/DR IN RCRD: CPT | Performed by: NURSE PRACTITIONER

## 2022-12-01 RX ORDER — AMLODIPINE BESYLATE 5 MG/1
5 TABLET ORAL DAILY
Qty: 90 TABLET | Refills: 1 | Status: SHIPPED | OUTPATIENT
Start: 2022-12-01

## 2022-12-01 RX ORDER — ESTRADIOL 0.1 MG/G
CREAM VAGINAL
COMMUNITY
Start: 2022-11-30

## 2022-12-01 RX ORDER — ROSUVASTATIN CALCIUM 5 MG/1
5 TABLET, COATED ORAL DAILY
Qty: 90 TABLET | Refills: 3 | Status: SHIPPED | OUTPATIENT
Start: 2022-12-01

## 2022-12-15 ENCOUNTER — HOSPITAL ENCOUNTER (OUTPATIENT)
Dept: DIABETES SERVICES | Facility: HOSPITAL | Age: 77
Discharge: HOME | End: 2022-12-15
Admitting: NURSE PRACTITIONER

## 2022-12-15 PROCEDURE — 97802 MEDICAL NUTRITION INDIV IN: CPT

## 2022-12-15 NOTE — PROGRESS NOTES
Ms. Pérez met with FERNANDEZ TRAMMELL for MNT for prediabetes. A comprehensive assessment/training record has been sent to medical records (see media tab) to associate with this encounter.      Hemoglobin A1c = 5.8%      We discussed meal planning - she was advised to follow “MyPlate” method to plan meals. We discussed the importance of eating regular meals. We reviewed how to read food labels. We reviewed consuming complex carbohydrates instead of simple carbohydrates. We discussed increased fiber and the impact on blood glucose. We reviewed carbohydrate portion sizes - we discussed importance of “balance meals/snacks”. She was advised to consume 30 to 45 grams of carb at meals and 15 grams of carb at snacks. Patient verbalized understanding of all information reviewed at today’s visit.       Ms. Pérez has been encouraged to call our office with questions or additional education needs. Please place referral for additional services or follow-up should need arise.     Thank you for the referral     Diana Michel RD, RUBI, JP

## 2023-02-08 ENCOUNTER — LAB (OUTPATIENT)
Dept: LAB | Facility: HOSPITAL | Age: 78
End: 2023-02-08
Payer: MEDICARE

## 2023-02-08 ENCOUNTER — TRANSCRIBE ORDERS (OUTPATIENT)
Dept: CARDIOLOGY | Facility: HOSPITAL | Age: 78
End: 2023-02-08
Payer: MEDICARE

## 2023-02-08 ENCOUNTER — TRANSCRIBE ORDERS (OUTPATIENT)
Dept: ADMINISTRATIVE | Facility: HOSPITAL | Age: 78
End: 2023-02-08
Payer: MEDICARE

## 2023-02-08 ENCOUNTER — HOSPITAL ENCOUNTER (OUTPATIENT)
Dept: CARDIOLOGY | Facility: HOSPITAL | Age: 78
Discharge: HOME | End: 2023-02-08
Payer: MEDICARE

## 2023-02-08 DIAGNOSIS — R79.1 ABNORMAL COAGULATION PROFILE: ICD-10-CM

## 2023-02-08 DIAGNOSIS — M17.11 ARTHRITIS OF RIGHT KNEE: ICD-10-CM

## 2023-02-08 DIAGNOSIS — Z01.812 PRE-OPERATIVE LABORATORY EXAMINATION: ICD-10-CM

## 2023-02-08 DIAGNOSIS — Z01.812 PRE-OPERATIVE LABORATORY EXAMINATION: Primary | ICD-10-CM

## 2023-02-08 DIAGNOSIS — Z01.811 PRE-OP CHEST EXAM: Primary | ICD-10-CM

## 2023-02-08 DIAGNOSIS — E03.8 SUBCLINICAL HYPOTHYROIDISM: ICD-10-CM

## 2023-02-08 LAB
ANION GAP SERPL CALCULATED.3IONS-SCNC: 7 MMOL/L (ref 5–15)
BASOPHILS # BLD AUTO: 0.06 10*3/MM3 (ref 0–0.2)
BASOPHILS NFR BLD AUTO: 0.9 % (ref 0–1.5)
BUN SERPL-MCNC: 7 MG/DL (ref 8–23)
BUN/CREAT SERPL: 8 (ref 7–25)
CALCIUM SPEC-SCNC: 9.4 MG/DL (ref 8.6–10.5)
CHLORIDE SERPL-SCNC: 102 MMOL/L (ref 98–107)
CO2 SERPL-SCNC: 29 MMOL/L (ref 22–29)
CREAT SERPL-MCNC: 0.87 MG/DL (ref 0.57–1)
DEPRECATED RDW RBC AUTO: 39.7 FL (ref 37–54)
EGFRCR SERPLBLD CKD-EPI 2021: 69.1 ML/MIN/1.73
EOSINOPHIL # BLD AUTO: 0.25 10*3/MM3 (ref 0–0.4)
EOSINOPHIL NFR BLD AUTO: 3.7 % (ref 0.3–6.2)
ERYTHROCYTE [DISTWIDTH] IN BLOOD BY AUTOMATED COUNT: 12.6 % (ref 12.3–15.4)
GLUCOSE SERPL-MCNC: 105 MG/DL (ref 65–99)
HCT VFR BLD AUTO: 39 % (ref 34–46.6)
HGB BLD-MCNC: 13.1 G/DL (ref 12–15.9)
IMM GRANULOCYTES # BLD AUTO: 0.02 10*3/MM3 (ref 0–0.05)
IMM GRANULOCYTES NFR BLD AUTO: 0.3 % (ref 0–0.5)
LYMPHOCYTES # BLD AUTO: 2.15 10*3/MM3 (ref 0.7–3.1)
LYMPHOCYTES NFR BLD AUTO: 32.2 % (ref 19.6–45.3)
MCH RBC QN AUTO: 29 PG (ref 26.6–33)
MCHC RBC AUTO-ENTMCNC: 33.6 G/DL (ref 31.5–35.7)
MCV RBC AUTO: 86.5 FL (ref 79–97)
MONOCYTES # BLD AUTO: 0.55 10*3/MM3 (ref 0.1–0.9)
MONOCYTES NFR BLD AUTO: 8.2 % (ref 5–12)
NEUTROPHILS NFR BLD AUTO: 3.64 10*3/MM3 (ref 1.7–7)
NEUTROPHILS NFR BLD AUTO: 54.7 % (ref 42.7–76)
NRBC BLD AUTO-RTO: 0 /100 WBC (ref 0–0.2)
PLATELET # BLD AUTO: 324 10*3/MM3 (ref 140–450)
PMV BLD AUTO: 8.5 FL (ref 6–12)
POTASSIUM SERPL-SCNC: 4.3 MMOL/L (ref 3.5–5.2)
QT INTERVAL: 430 MS
RBC # BLD AUTO: 4.51 10*6/MM3 (ref 3.77–5.28)
SODIUM SERPL-SCNC: 138 MMOL/L (ref 136–145)
T4 FREE SERPL-MCNC: 1.01 NG/DL (ref 0.93–1.7)
TSH SERPL DL<=0.05 MIU/L-ACNC: 4.66 UIU/ML (ref 0.27–4.2)
WBC NRBC COR # BLD: 6.67 10*3/MM3 (ref 3.4–10.8)

## 2023-02-08 PROCEDURE — 84439 ASSAY OF FREE THYROXINE: CPT

## 2023-02-08 PROCEDURE — 84443 ASSAY THYROID STIM HORMONE: CPT

## 2023-02-08 PROCEDURE — 93005 ELECTROCARDIOGRAM TRACING: CPT

## 2023-02-08 PROCEDURE — 93010 ELECTROCARDIOGRAM REPORT: CPT | Performed by: STUDENT IN AN ORGANIZED HEALTH CARE EDUCATION/TRAINING PROGRAM

## 2023-02-08 PROCEDURE — 36415 COLL VENOUS BLD VENIPUNCTURE: CPT

## 2023-02-08 PROCEDURE — 85025 COMPLETE CBC W/AUTO DIFF WBC: CPT

## 2023-02-08 PROCEDURE — 80048 BASIC METABOLIC PNL TOTAL CA: CPT

## 2023-02-23 DIAGNOSIS — E03.9 HYPOTHYROIDISM, UNSPECIFIED TYPE: Primary | ICD-10-CM

## 2023-04-06 ENCOUNTER — OFFICE VISIT (OUTPATIENT)
Dept: INTERNAL MEDICINE | Facility: CLINIC | Age: 78
End: 2023-04-06
Payer: MEDICARE

## 2023-04-06 VITALS
DIASTOLIC BLOOD PRESSURE: 78 MMHG | SYSTOLIC BLOOD PRESSURE: 130 MMHG | HEART RATE: 80 BPM | BODY MASS INDEX: 40.08 KG/M2 | WEIGHT: 240.6 LBS | HEIGHT: 65 IN | RESPIRATION RATE: 16 BRPM | TEMPERATURE: 96.8 F

## 2023-04-06 DIAGNOSIS — D64.9 ANEMIA, UNSPECIFIED TYPE: ICD-10-CM

## 2023-04-06 DIAGNOSIS — J18.9 PNEUMONIA OF RIGHT MIDDLE LOBE DUE TO INFECTIOUS ORGANISM: Primary | ICD-10-CM

## 2023-04-06 DIAGNOSIS — D64.9 ANEMIA, UNSPECIFIED TYPE: Primary | ICD-10-CM

## 2023-04-06 DIAGNOSIS — R11.0 NAUSEA: ICD-10-CM

## 2023-04-06 PROBLEM — J96.01 ACUTE RESPIRATORY FAILURE WITH HYPOXIA: Status: ACTIVE | Noted: 2023-04-01

## 2023-04-06 LAB
ERYTHROCYTE [DISTWIDTH] IN BLOOD BY AUTOMATED COUNT: 13.1 % (ref 12.3–15.4)
HCT VFR BLD AUTO: 38.8 % (ref 34–46.6)
HGB BLD-MCNC: 12.6 G/DL (ref 12–15.9)
MCH RBC QN AUTO: 29.3 PG (ref 26.6–33)
MCHC RBC AUTO-ENTMCNC: 32.5 G/DL (ref 31.5–35.7)
MCV RBC AUTO: 90.2 FL (ref 79–97)
PLATELET # BLD AUTO: 508 10*3/MM3 (ref 140–450)
RBC # BLD AUTO: 4.3 10*6/MM3 (ref 3.77–5.28)
WBC # BLD AUTO: 9.77 10*3/MM3 (ref 3.4–10.8)

## 2023-04-06 RX ORDER — OXYCODONE AND ACETAMINOPHEN 7.5; 325 MG/1; MG/1
1 TABLET ORAL EVERY 4 HOURS PRN
COMMUNITY

## 2023-04-06 RX ORDER — ASCORBIC ACID 500 MG
TABLET ORAL
COMMUNITY

## 2023-04-06 RX ORDER — LEVOFLOXACIN 750 MG/1
TABLET ORAL
COMMUNITY
Start: 2023-04-02

## 2023-04-06 RX ORDER — TRAMADOL HYDROCHLORIDE 50 MG/1
1 TABLET ORAL EVERY 6 HOURS
COMMUNITY
Start: 2023-03-27

## 2023-04-06 RX ORDER — CELECOXIB 200 MG/1
1 CAPSULE ORAL DAILY
COMMUNITY
Start: 2023-03-27

## 2023-04-06 RX ORDER — ONDANSETRON 4 MG/1
1 TABLET, FILM COATED ORAL EVERY 6 HOURS
COMMUNITY
Start: 2023-03-27 | End: 2023-04-06 | Stop reason: SDUPTHER

## 2023-04-06 RX ORDER — ASPIRIN 81 MG/1
81 TABLET ORAL 2 TIMES DAILY
COMMUNITY

## 2023-04-06 RX ORDER — ONDANSETRON 4 MG/1
4 TABLET, FILM COATED ORAL EVERY 8 HOURS PRN
Qty: 30 TABLET | Refills: 1 | Status: SHIPPED | OUTPATIENT
Start: 2023-04-06

## 2023-04-06 RX ORDER — DOCUSATE SODIUM 100 MG/1
100 CAPSULE, LIQUID FILLED ORAL 2 TIMES DAILY
COMMUNITY

## 2023-04-06 NOTE — PROGRESS NOTES
Subjective   Chief Complaint   Patient presents with   • Hospital Follow Up Visit       History of Present Illness   77 y.o. female presents for hospital follow up regarding PNA and acute hypoxic respiratory failure. She presented to [facility]/20[facility] with cc dyspnea after having knee replacement 3/28/2023 with Dr. Roach. O2 sats 85% on RA. Imaging revealed RML PNA. Als0 noted to have postoperative anemia with Hgb 9.6. Discharged home 04/03/23 with Levaquin 750 mg x 5 days. Last dose is today.      Breathing is improved. Fatigue continues. Graduates to outpt PT next week. Using O2 2L when she is up and about but not at rest--improved.      Patient Active Problem List   Diagnosis   • Mixed anxiety depressive disorder   • Hyperlipidemia   • Prediabetes   • Obesity (BMI 30-39.9)   • Nausea   • Hypersomnia with sleep apnea   • Asthma   • GERD (gastroesophageal reflux disease)   • Arthritis of left knee   • Essential hypertension   • Subclinical hypothyroidism   • Acute respiratory failure with hypoxia   • Anemia       Allergies   Allergen Reactions   • Adhesive Tape Hives   • Pantoprazole Nausea Only     Other reaction(s): Headache   • Penicillins Hives   • Nickel Rash     With cheap jewelry   • Troleandomycin Hives       Current Outpatient Medications on File Prior to Visit   Medication Sig Dispense Refill   • ALPRAZolam (XANAX) 0.5 MG tablet Take 1 tablet by mouth.     • amLODIPine (NORVASC) 5 MG tablet Take 1 tablet by mouth Daily. 90 tablet 1   • budesonide-formoterol (SYMBICORT) 160-4.5 MCG/ACT inhaler Symbicort 160-4.5 MCG/ACT Inhalation Aerosol; Patient Sig: Symbicort 160-4.5 MCG/ACT Inhalation Aerosol ; 10; 0; 28-Jul-2015; Active     • buPROPion XL (WELLBUTRIN XL) 150 MG 24 hr tablet      • citalopram (CeleXA) 20 MG tablet      • cycloSPORINE (RESTASIS) 0.05 % ophthalmic emulsion 1 drop 2 (Two) Times a Day.     • docusate sodium (COLACE) 100 MG capsule Take 1 capsule by mouth 2 (Two) Times a Day.     • Dupixent  300 MG/2ML solution pen-injector      • EPINEPHrine (EPIPEN) 0.3 MG/0.3ML solution auto-injector injection EpiPen 2-Mitch 0.3 MG/0.3ML Injection Solution Auto-injector; Patient Sig: EpiPen 2-Mitch 0.3 MG/0.3ML Injection Solution Auto-injector ; 2; 0; 21-Nov-2014; Active     • esomeprazole (NexIUM) 40 MG capsule Take 1 capsule by mouth.     • estradiol (ESTRACE) 0.1 MG/GM vaginal cream      • Fish Oil-Krill Oil (KRILL OIL PLUS) capsule Take  by mouth.     • gabapentin (NEURONTIN) 100 MG capsule 1 capsule Daily.     • montelukast (SINGULAIR) 10 MG tablet Take  by mouth.     • oxyCODONE-acetaminophen (PERCOCET) 7.5-325 MG per tablet Take 1 tablet by mouth Every 4 (Four) Hours As Needed.     • Probiotic Product (ALIGN) capsule Take  by mouth.     • rosuvastatin (CRESTOR) 5 MG tablet Take 1 tablet by mouth Daily. 90 tablet 3   • VENTOLIN  (90 BASE) MCG/ACT inhaler      • ascorbic acid (VITAMIN C) 500 MG tablet Vitamin C 500 mg tablet     • aspirin 81 MG EC tablet Take 1 tablet by mouth 2 (Two) Times a Day.     • Azelastine-Fluticasone 137-50 MCG/ACT suspension  (Patient not taking: Reported on 4/6/2023)     • celecoxib (CeleBREX) 200 MG capsule Take 1 capsule by mouth Daily.     • levoFLOXacin (LEVAQUIN) 750 MG tablet      • traMADol (ULTRAM) 50 MG tablet Take 1 tablet by mouth Every 6 (Six) Hours.     • [DISCONTINUED] ondansetron (ZOFRAN) 4 MG tablet Take 1 tablet by mouth Every 6 (Six) Hours.       Current Facility-Administered Medications on File Prior to Visit   Medication Dose Route Frequency Provider Last Rate Last Admin   • promethazine (PHENERGAN) tablet 25 mg  25 mg Oral Q8H PRN Nj Coto MD           Past Medical History:   Diagnosis Date   • Achilles tendinitis of right lower extremity    • Esophageal stricture    • GERD (gastroesophageal reflux disease)    • Hemorrhoids 11/24/2015   • Hyperlipidemia    • Inflammatory fibroid polyps of stomach        Family History   Problem Relation Age of Onset  "  • Depression Mother    • Depression Brother    • Hypertension Daughter    • Hypertension Son        Social History     Socioeconomic History   • Marital status:    Tobacco Use   • Smoking status: Former   Substance and Sexual Activity   • Alcohol use: No   • Drug use: No   • Sexual activity: Never       Past Surgical History:   Procedure Laterality Date   • BREAST SURGERY     • CHOLECYSTECTOMY     • COLONOSCOPY     • INCISIONAL BREAST BIOPSY     • OOPHORECTOMY     • REPLACEMENT TOTAL KNEE Left    • TONSILLECTOMY AND ADENOIDECTOMY     • TOTAL SHOULDER ARTHROPLASTY         The following portions of the patient's history were reviewed and updated as appropriate: problem list, allergies, current medications, past medical history, past social history and past surgical history.    Review of Systems    Immunization History   Administered Date(s) Administered   • COVID-19 (PFIZER) PURPLE CAP 02/04/2021, 02/25/2021, 04/11/2021, 10/23/2021   • Covid-19 (Pfizer) Gray Cap 05/16/2022   • FLUAD TRI 65YR+ 10/06/2020   • Flu Vaccine Quad PF >36MO 09/18/2018   • Fluzone High Dose =>65 Years ([employer] ONLY) 10/01/2015, 09/18/2018, 10/08/2019   • Fluzone High-Dose 65+yrs 08/25/2021, 11/03/2022   • Hepatitis A 04/26/2018, 11/02/2018   • Influenza Quad Vaccine (Inpatient) 09/20/2017   • Influenza TIV (IM) 09/20/2017   • Influenza, Unspecified 10/15/2015   • Pneumococcal Conjugate 13-Valent (PCV13) 09/28/2015   • Pneumococcal Polysaccharide (PPSV23) 10/19/2020   • Shingrix 10/30/2019, 10/06/2020, 10/23/2020   • TD Preservative Free 07/15/2021   • Td, Unspecified 08/15/2006   • Tdap 09/06/2016   • Zostavax 02/15/2007       Objective   Vitals:    04/06/23 1026   BP: 130/78   Pulse: 80   Resp: 16   Temp: 96.8 °F (36 °C)   Weight: 109 kg (240 lb 9.6 oz)   Height: 165.1 cm (65\")     Body mass index is 40.04 kg/m².  Physical Exam  Vitals reviewed.   Constitutional:       Appearance: Normal appearance. She is well-developed. She is " obese.   HENT:      Head: Normocephalic and atraumatic.   Cardiovascular:      Rate and Rhythm: Normal rate and regular rhythm.      Heart sounds: Normal heart sounds, S1 normal and S2 normal.      Comments: O2 at 2L pnc.   Pulmonary:      Effort: Pulmonary effort is normal.      Breath sounds: Normal breath sounds.   Musculoskeletal:      Comments: Dressing present on right knee. No erythema on inspection. Area not tender with light touch. No heat.    Skin:     General: Skin is warm and dry.   Neurological:      Mental Status: She is alert.   Psychiatric:         Mood and Affect: Mood normal.         Behavior: Behavior normal.         Procedures    Assessment & Plan   Diagnoses and all orders for this visit:    1. Pneumonia of right middle lobe due to infectious organism (Primary)  Comments:  Completes Levaquin 750 mg today. Weaning down O2 use. Repeat chest CT in 6 weeks.   Orders:  -     CT Chest Without Contrast Diagnostic; Future    2. Anemia, unspecified type  Comments:  Likely post-operative as she had knee replacement 03/31/2023 with Dr. Roach. Repeat CBC today. Also repeat prior to follow up visit next month.   Orders:  -     CBC (No Diff)    3. Nausea  Comments:  With post-operative medications. No emesis. Relieved with Zofran 4 mg--will refill.   Orders:  -     ondansetron (ZOFRAN) 4 MG tablet; Take 1 tablet by mouth Every 8 (Eight) Hours As Needed for Nausea or Vomiting.  Dispense: 30 tablet; Refill: 1    Hospital records 03/31/23-04/03/2023 reviewed    Return for Next scheduled follow up.

## 2023-04-24 ENCOUNTER — OFFICE VISIT (OUTPATIENT)
Dept: INTERNAL MEDICINE | Facility: CLINIC | Age: 78
End: 2023-04-24
Payer: MEDICARE

## 2023-04-24 VITALS
HEART RATE: 96 BPM | RESPIRATION RATE: 16 BRPM | TEMPERATURE: 98.2 F | OXYGEN SATURATION: 96 % | HEIGHT: 65 IN | WEIGHT: 240 LBS | BODY MASS INDEX: 39.99 KG/M2

## 2023-04-24 DIAGNOSIS — R05.9 COUGH, UNSPECIFIED TYPE: ICD-10-CM

## 2023-04-24 DIAGNOSIS — H66.92 LEFT OTITIS MEDIA, UNSPECIFIED OTITIS MEDIA TYPE: Primary | ICD-10-CM

## 2023-04-24 PROBLEM — J96.01 ACUTE RESPIRATORY FAILURE WITH HYPOXIA: Status: RESOLVED | Noted: 2023-04-01 | Resolved: 2023-04-24

## 2023-04-24 LAB
EXPIRATION DATE: NORMAL
FLUAV AG UPPER RESP QL IA.RAPID: NOT DETECTED
FLUBV AG UPPER RESP QL IA.RAPID: NOT DETECTED
INTERNAL CONTROL: NORMAL
Lab: NORMAL
SARS-COV-2 AG UPPER RESP QL IA.RAPID: NOT DETECTED

## 2023-04-24 PROCEDURE — 99213 OFFICE O/P EST LOW 20 MIN: CPT | Performed by: NURSE PRACTITIONER

## 2023-04-24 PROCEDURE — 1159F MED LIST DOCD IN RCRD: CPT | Performed by: NURSE PRACTITIONER

## 2023-04-24 PROCEDURE — 1160F RVW MEDS BY RX/DR IN RCRD: CPT | Performed by: NURSE PRACTITIONER

## 2023-04-24 PROCEDURE — 87428 SARSCOV & INF VIR A&B AG IA: CPT | Performed by: NURSE PRACTITIONER

## 2023-04-24 RX ORDER — CEFDINIR 300 MG/1
300 CAPSULE ORAL 2 TIMES DAILY
Qty: 14 CAPSULE | Refills: 0 | Status: SHIPPED | OUTPATIENT
Start: 2023-04-24

## 2023-04-24 NOTE — PROGRESS NOTES
Subjective   Chief Complaint   Patient presents with   • Cough   • Earache       History of Present Illness   77 y.o. female presents with cc cough ongoing times 5-7 days. Started as a dry cough but last night became productive with brown sputum. Reports pain and congestion in left ear; no ear drainage.  Denies fever or chills. Sinus drainage continues. Mild dyspnea. O2 sats stay about 95%.Continues daily Symbicort Denies wheezing or sore throat. Notes PND. She had PNA earlier this month (treated with Levaquin with repeat imaging upcoming). She has a history of asthma. Home COVID test negative x2.      Patient Active Problem List   Diagnosis   • Mixed anxiety depressive disorder   • Hyperlipidemia   • Prediabetes   • Obesity (BMI 30-39.9)   • Nausea   • Hypersomnia with sleep apnea   • Asthma   • GERD (gastroesophageal reflux disease)   • Arthritis of left knee   • Essential hypertension   • Subclinical hypothyroidism   • Anemia       Allergies   Allergen Reactions   • Adhesive Tape Hives   • Pantoprazole Nausea Only     Other reaction(s): Headache   • Penicillins Hives   • Nickel Rash     With cheap jewelry   • Troleandomycin Hives       Current Outpatient Medications on File Prior to Visit   Medication Sig Dispense Refill   • ALPRAZolam (XANAX) 0.5 MG tablet Take 1 tablet by mouth.     • amLODIPine (NORVASC) 5 MG tablet Take 1 tablet by mouth Daily. 90 tablet 1   • ascorbic acid (VITAMIN C) 500 MG tablet Vitamin C 500 mg tablet     • aspirin 81 MG EC tablet Take 1 tablet by mouth 2 (Two) Times a Day.     • Azelastine-Fluticasone 137-50 MCG/ACT suspension      • budesonide-formoterol (SYMBICORT) 160-4.5 MCG/ACT inhaler Symbicort 160-4.5 MCG/ACT Inhalation Aerosol; Patient Sig: Symbicort 160-4.5 MCG/ACT Inhalation Aerosol ; 10; 0; 28-Jul-2015; Active     • buPROPion XL (WELLBUTRIN XL) 150 MG 24 hr tablet      • celecoxib (CeleBREX) 200 MG capsule Take 1 capsule by mouth Daily.     • citalopram (CeleXA) 20 MG tablet       • cycloSPORINE (RESTASIS) 0.05 % ophthalmic emulsion 1 drop 2 (Two) Times a Day.     • docusate sodium (COLACE) 100 MG capsule Take 1 capsule by mouth 2 (Two) Times a Day.     • Dupixent 300 MG/2ML solution pen-injector      • EPINEPHrine (EPIPEN) 0.3 MG/0.3ML solution auto-injector injection EpiPen 2-Mitch 0.3 MG/0.3ML Injection Solution Auto-injector; Patient Sig: EpiPen 2-Mitch 0.3 MG/0.3ML Injection Solution Auto-injector ; 2; 0; 21-Nov-2014; Active     • esomeprazole (NexIUM) 40 MG capsule Take 1 capsule by mouth.     • estradiol (ESTRACE) 0.1 MG/GM vaginal cream      • Fish Oil-Krill Oil (KRILL OIL PLUS) capsule Take  by mouth.     • gabapentin (NEURONTIN) 100 MG capsule 1 capsule Daily.     • levoFLOXacin (LEVAQUIN) 750 MG tablet      • montelukast (SINGULAIR) 10 MG tablet Take  by mouth.     • ondansetron (ZOFRAN) 4 MG tablet Take 1 tablet by mouth Every 8 (Eight) Hours As Needed for Nausea or Vomiting. 30 tablet 1   • oxyCODONE-acetaminophen (PERCOCET) 7.5-325 MG per tablet Take 1 tablet by mouth Every 4 (Four) Hours As Needed.     • Probiotic Product (ALIGN) capsule Take  by mouth.     • rosuvastatin (CRESTOR) 5 MG tablet Take 1 tablet by mouth Daily. 90 tablet 3   • traMADol (ULTRAM) 50 MG tablet Take 1 tablet by mouth Every 6 (Six) Hours.     • VENTOLIN  (90 BASE) MCG/ACT inhaler        Current Facility-Administered Medications on File Prior to Visit   Medication Dose Route Frequency Provider Last Rate Last Admin   • promethazine (PHENERGAN) tablet 25 mg  25 mg Oral Q8H PRN Nj Coto MD           Past Medical History:   Diagnosis Date   • Achilles tendinitis of right lower extremity    • Esophageal stricture    • GERD (gastroesophageal reflux disease)    • Hemorrhoids 11/24/2015   • Hyperlipidemia    • Inflammatory fibroid polyps of stomach        Family History   Problem Relation Age of Onset   • Depression Mother    • Depression Brother    • Hypertension Daughter    • Hypertension Son   "      Social History     Socioeconomic History   • Marital status:    Tobacco Use   • Smoking status: Former   Substance and Sexual Activity   • Alcohol use: No   • Drug use: No   • Sexual activity: Never       Past Surgical History:   Procedure Laterality Date   • BREAST SURGERY     • CHOLECYSTECTOMY     • COLONOSCOPY     • INCISIONAL BREAST BIOPSY     • OOPHORECTOMY     • REPLACEMENT TOTAL KNEE Left    • TONSILLECTOMY AND ADENOIDECTOMY     • TOTAL SHOULDER ARTHROPLASTY         The following portions of the patient's history were reviewed and updated as appropriate: problem list, allergies, current medications, past medical history and past social history.    Review of Systems    Immunization History   Administered Date(s) Administered   • COVID-19 (PFIZER) PURPLE CAP 02/04/2021, 02/25/2021, 04/11/2021, 10/23/2021   • Covid-19 (Pfizer) Gray Cap 05/16/2022   • FLUAD TRI 65YR+ 10/06/2020   • Flu Vaccine Quad PF >36MO 09/18/2018   • Fluzone High Dose =>65 Years ([facility] ONLY) 10/01/2015, 09/18/2018, 10/08/2019   • Fluzone High-Dose 65+yrs 08/25/2021, 11/03/2022   • Hepatitis A 04/26/2018, 11/02/2018   • Influenza Quad Vaccine (Inpatient) 09/20/2017   • Influenza TIV (IM) 09/20/2017   • Influenza, Unspecified 10/15/2015   • Pneumococcal Conjugate 13-Valent (PCV13) 09/28/2015   • Pneumococcal Polysaccharide (PPSV23) 10/19/2020   • Shingrix 10/30/2019, 10/06/2020, 10/23/2020   • TD Preservative Free 07/15/2021   • Td, Unspecified 08/15/2006   • Tdap 09/06/2016   • Zostavax 02/15/2007       Objective   Vitals:    04/24/23 0956   Pulse: 96   Resp: 16   Temp: 98.2 °F (36.8 °C)   TempSrc: Temporal   SpO2: 96%   Weight: 109 kg (240 lb)   Height: 165.1 cm (65\")     Body mass index is 39.94 kg/m².  Physical Exam  HENT:      Head:      Comments: Left ear canal erythematous     Right Ear: Hearing, tympanic membrane, ear canal and external ear normal.      Left Ear: Hearing and external ear normal. Tympanic membrane is " erythematous.         Procedures    Assessment & Plan   Diagnoses and all orders for this visit:    1. Left otitis media, unspecified otitis media type (Primary)  -     cefdinir (OMNICEF) 300 MG capsule; Take 1 capsule by mouth 2 (Two) Times a Day.  Dispense: 14 capsule; Refill: 0    2. Cough, unspecified type  -     POCT SARS-CoV-2 Antigen ELIZABETH + Flu  -     cefdinir (OMNICEF) 300 MG capsule; Take 1 capsule by mouth 2 (Two) Times a Day.  Dispense: 14 capsule; Refill: 0    Rapid COVID/flu negative. Start Cefdinir as above. Lungs sound good but given PC with brown sputum and recent PNA CXR offered--she declined and will let me know how she is doing later this week. Mucinex advised.     Records reviewed include previous OV with myself as well as labs.     Return for Next scheduled follow up.

## 2023-05-04 ENCOUNTER — OFFICE VISIT (OUTPATIENT)
Dept: INTERNAL MEDICINE | Facility: CLINIC | Age: 78
End: 2023-05-04
Payer: MEDICARE

## 2023-05-04 VITALS — HEIGHT: 63 IN | BODY MASS INDEX: 42.52 KG/M2 | WEIGHT: 240 LBS | TEMPERATURE: 98.3 F

## 2023-05-04 DIAGNOSIS — H66.92 LEFT OTITIS MEDIA, UNSPECIFIED OTITIS MEDIA TYPE: Primary | ICD-10-CM

## 2023-05-04 PROCEDURE — 1159F MED LIST DOCD IN RCRD: CPT | Performed by: NURSE PRACTITIONER

## 2023-05-04 PROCEDURE — 99213 OFFICE O/P EST LOW 20 MIN: CPT | Performed by: NURSE PRACTITIONER

## 2023-05-04 PROCEDURE — 1160F RVW MEDS BY RX/DR IN RCRD: CPT | Performed by: NURSE PRACTITIONER

## 2023-05-04 RX ORDER — DOXYCYCLINE HYCLATE 100 MG/1
100 CAPSULE ORAL 2 TIMES DAILY
Qty: 14 CAPSULE | Refills: 0 | Status: SHIPPED | OUTPATIENT
Start: 2023-05-04

## 2023-05-04 RX ORDER — METHYLPREDNISOLONE 4 MG/1
TABLET ORAL
Qty: 21 TABLET | Refills: 0 | Status: SHIPPED | OUTPATIENT
Start: 2023-05-04

## 2023-05-04 NOTE — PROGRESS NOTES
Subjective   Chief Complaint   Patient presents with   • Earache     left       History of Present Illness   77 y.o. female presents with left ear ache ongoing times 3 days after completing 7 days of Cefdinir for left otitis media. Initially pain resolved and she was feeling much better. Denies fever or chills. Hearing decreased. No sore throat, cough or drainage.      Patient Active Problem List   Diagnosis   • Mixed anxiety depressive disorder   • Hyperlipidemia   • Prediabetes   • Obesity (BMI 30-39.9)   • Nausea   • Hypersomnia with sleep apnea   • Asthma   • GERD (gastroesophageal reflux disease)   • Arthritis of left knee   • Essential hypertension   • Subclinical hypothyroidism   • Anemia       Allergies   Allergen Reactions   • Adhesive Tape Hives   • Pantoprazole Nausea Only     Other reaction(s): Headache   • Penicillins Hives   • Nickel Rash     With cheap jewelry   • Troleandomycin Hives       Current Outpatient Medications on File Prior to Visit   Medication Sig Dispense Refill   • ALPRAZolam (XANAX) 0.5 MG tablet Take 1 tablet by mouth.     • amLODIPine (NORVASC) 5 MG tablet Take 1 tablet by mouth Daily. 90 tablet 1   • aspirin 81 MG EC tablet Take 1 tablet by mouth 2 (Two) Times a Day.     • Azelastine-Fluticasone 137-50 MCG/ACT suspension      • budesonide-formoterol (SYMBICORT) 160-4.5 MCG/ACT inhaler Symbicort 160-4.5 MCG/ACT Inhalation Aerosol; Patient Sig: Symbicort 160-4.5 MCG/ACT Inhalation Aerosol ; 10; 0; 28-Jul-2015; Active     • buPROPion XL (WELLBUTRIN XL) 150 MG 24 hr tablet      • celecoxib (CeleBREX) 200 MG capsule Take 1 capsule by mouth Daily.     • citalopram (CeleXA) 20 MG tablet      • cycloSPORINE (RESTASIS) 0.05 % ophthalmic emulsion 1 drop 2 (Two) Times a Day.     • docusate sodium (COLACE) 100 MG capsule Take 1 capsule by mouth 2 (Two) Times a Day.     • Dupixent 300 MG/2ML solution pen-injector      • EPINEPHrine (EPIPEN) 0.3 MG/0.3ML solution auto-injector injection EpiPen  2-Mitch 0.3 MG/0.3ML Injection Solution Auto-injector; Patient Sig: EpiPen 2-Mitch 0.3 MG/0.3ML Injection Solution Auto-injector ; 2; 0; 21-Nov-2014; Active     • esomeprazole (NexIUM) 40 MG capsule Take 1 capsule by mouth.     • estradiol (ESTRACE) 0.1 MG/GM vaginal cream      • Fish Oil-Krill Oil (KRILL OIL PLUS) capsule Take  by mouth.     • gabapentin (NEURONTIN) 100 MG capsule 1 capsule Daily.     • montelukast (SINGULAIR) 10 MG tablet Take  by mouth.     • ondansetron (ZOFRAN) 4 MG tablet Take 1 tablet by mouth Every 8 (Eight) Hours As Needed for Nausea or Vomiting. 30 tablet 1   • Probiotic Product (ALIGN) capsule Take  by mouth.     • rosuvastatin (CRESTOR) 5 MG tablet Take 1 tablet by mouth Daily. 90 tablet 3   • VENTOLIN  (90 BASE) MCG/ACT inhaler      • [DISCONTINUED] ascorbic acid (VITAMIN C) 500 MG tablet Vitamin C 500 mg tablet     • [DISCONTINUED] cefdinir (OMNICEF) 300 MG capsule Take 1 capsule by mouth 2 (Two) Times a Day. 14 capsule 0   • [DISCONTINUED] levoFLOXacin (LEVAQUIN) 750 MG tablet      • [DISCONTINUED] traMADol (ULTRAM) 50 MG tablet Take 1 tablet by mouth Every 6 (Six) Hours.       No current facility-administered medications on file prior to visit.       Past Medical History:   Diagnosis Date   • Achilles tendinitis of right lower extremity    • Esophageal stricture    • GERD (gastroesophageal reflux disease)    • Hemorrhoids 11/24/2015   • Hyperlipidemia    • Inflammatory fibroid polyps of stomach        Family History   Problem Relation Age of Onset   • Depression Mother    • Depression Brother    • Hypertension Daughter    • Hypertension Son        Social History     Socioeconomic History   • Marital status:    Tobacco Use   • Smoking status: Former   Substance and Sexual Activity   • Alcohol use: No   • Drug use: No   • Sexual activity: Never       Past Surgical History:   Procedure Laterality Date   • BREAST SURGERY     • CHOLECYSTECTOMY     • COLONOSCOPY     • INCISIONAL  "BREAST BIOPSY     • OOPHORECTOMY     • REPLACEMENT TOTAL KNEE Left    • TONSILLECTOMY AND ADENOIDECTOMY     • TOTAL SHOULDER ARTHROPLASTY         The following portions of the patient's history were reviewed and updated as appropriate: problem list, allergies, current medications, past medical history and past social history.    Review of Systems    Immunization History   Administered Date(s) Administered   • COVID-19 (PFIZER) Purple Cap Monovalent 02/04/2021, 02/25/2021, 04/11/2021, 10/23/2021   • Covid-19 (Pfizer) Gray Cap Monovalent 05/16/2022   • FLUAD TRI 65YR+ 10/06/2020   • Flu Vaccine Quad PF >36MO 09/18/2018   • Fluzone High Dose =>65 Years ([facility] ONLY) 10/01/2015, 09/18/2018, 10/08/2019   • Fluzone High-Dose 65+yrs 08/25/2021, 11/03/2022   • Hepatitis A 04/26/2018, 11/02/2018   • Influenza Quad Vaccine (Inpatient) 09/20/2017   • Influenza TIV (IM) 09/20/2017   • Influenza, Unspecified 10/15/2015   • Pneumococcal Conjugate 13-Valent (PCV13) 09/28/2015   • Pneumococcal Polysaccharide (PPSV23) 10/19/2020   • Shingrix 10/30/2019, 10/06/2020, 10/23/2020   • TD Preservative Free 07/15/2021   • Td, Unspecified 08/15/2006   • Tdap 09/06/2016   • Zostavax 02/15/2007       Objective   Vitals:    05/04/23 1031   Temp: 98.3 °F (36.8 °C)   TempSrc: Temporal   Weight: 109 kg (240 lb)   Height: 161.1 cm (63.43\")     Body mass index is 41.95 kg/m².  Physical Exam  Constitutional:       Appearance: Normal appearance. She is obese.   HENT:      Head: Normocephalic and atraumatic.      Ears:      Comments: R-EAC erythematous and R-TM injected.      Nose: Nose normal.      Mouth/Throat:      Mouth: Mucous membranes are moist.      Pharynx: Oropharynx is clear. No oropharyngeal exudate or posterior oropharyngeal erythema.   Musculoskeletal:      Cervical back: Neck supple.   Lymphadenopathy:      Cervical: No cervical adenopathy.   Neurological:      Mental Status: She is alert.   Psychiatric:         Mood and Affect: Mood " normal.         Behavior: Behavior normal.         Procedures    Assessment & Plan   Diagnoses and all orders for this visit:    1. Left otitis media, unspecified otitis media type (Primary)  Comments:  She will let me know how she is doing next week; schedule with Dr. Mak given decreased hearing.   Orders:  -     methylPREDNISolone (MEDROL) 4 MG dose pack; Take as directed on package instructions.  Dispense: 21 tablet; Refill: 0  -     doxycycline (VIBRAMYCIN) 100 MG capsule; Take 1 capsule by mouth 2 (Two) Times a Day.  Dispense: 14 capsule; Refill: 0  -     Ambulatory Referral to ENT (Otolaryngology)    Records reviewed include previous OV with myself.    Return for Next scheduled follow up.

## 2023-05-08 ENCOUNTER — TELEPHONE (OUTPATIENT)
Dept: INTERNAL MEDICINE | Facility: CLINIC | Age: 78
End: 2023-05-08
Payer: MEDICARE

## 2023-05-08 NOTE — TELEPHONE ENCOUNTER
Patient is wanting to know if she could have more of the medication that Pippa called in for her for her ear, per Pippa patient is to call if it has not cleared up, please call Ms. Pérez at [phone].

## 2023-05-09 DIAGNOSIS — H66.92 LEFT OTITIS MEDIA, UNSPECIFIED OTITIS MEDIA TYPE: Primary | ICD-10-CM

## 2023-05-09 RX ORDER — DOXYCYCLINE HYCLATE 100 MG/1
100 CAPSULE ORAL DAILY
Qty: 6 CAPSULE | Refills: 0 | Status: SHIPPED | OUTPATIENT
Start: 2023-05-09

## 2023-05-26 DIAGNOSIS — I10 ESSENTIAL HYPERTENSION: Chronic | ICD-10-CM

## 2023-05-26 RX ORDER — AMLODIPINE BESYLATE 5 MG/1
TABLET ORAL
Qty: 90 TABLET | Refills: 1 | Status: SHIPPED | OUTPATIENT
Start: 2023-05-26

## 2023-05-28 RX ORDER — FLUOCINONIDE TOPICAL SOLUTION USP, 0.05% 0.5 MG/ML
SOLUTION TOPICAL
COMMUNITY
Start: 2023-05-22

## 2023-05-28 NOTE — PATIENT INSTRUCTIONS
An order has been placed for your screening colonoscopy. Please be sure to schedule this.      Weight loss via dietary changes and 150 minutes weekly aerobic activity advised.

## 2023-05-28 NOTE — PROGRESS NOTES
Subjective   Chief Complaint   Patient presents with   • Hypertension       History of Present Illness   77 y.o. female presents for follow up regarding HTN, subclinical hypothyroidism, prediabetes and obesity. She is feeling good overall. Denies chest pain or dyspnea. Reports low back pain. Denies weakness, numbness or tingling. No B/B incontinence. She wonders if it is weight related. Celebrex (initially prescribed after knee replacement ) helps.     Seeing Dr. Fletcher in 2 weeks regarding decreased hearing in left ear. Chest CT in 2 days (follow up regarding PNA). She will schedule her CLS.        Patient Active Problem List   Diagnosis   • Mixed anxiety depressive disorder   • Hyperlipidemia   • Prediabetes   • Obesity (BMI 30-39.9)   • Nausea   • Hypersomnia with sleep apnea   • Asthma   • GERD (gastroesophageal reflux disease)   • Arthritis of left knee   • Essential hypertension   • Subclinical hypothyroidism   • Anemia       Allergies   Allergen Reactions   • Adhesive Tape Hives   • Pantoprazole Nausea Only     Other reaction(s): Headache   • Penicillins Hives   • Nickel Rash     With cheap jewelry   • Troleandomycin Hives       Current Outpatient Medications on File Prior to Visit   Medication Sig Dispense Refill   • ALPRAZolam (XANAX) 0.5 MG tablet Take 1 tablet by mouth.     • aspirin 81 MG EC tablet Take 1 tablet by mouth 2 (Two) Times a Day.     • Azelastine-Fluticasone 137-50 MCG/ACT suspension      • budesonide-formoterol (SYMBICORT) 160-4.5 MCG/ACT inhaler Symbicort 160-4.5 MCG/ACT Inhalation Aerosol; Patient Sig: Symbicort 160-4.5 MCG/ACT Inhalation Aerosol ; 10; 0; 28-Jul-2015; Active     • buPROPion XL (WELLBUTRIN XL) 150 MG 24 hr tablet      • citalopram (CeleXA) 20 MG tablet      • Dupixent 300 MG/2ML solution pen-injector      • EPINEPHrine (EPIPEN) 0.3 MG/0.3ML solution auto-injector injection EpiPen 2-Mitch 0.3 MG/0.3ML Injection Solution Auto-injector; Patient Sig: EpiPen 2-Mitch 0.3 MG/0.3ML  Injection Solution Auto-injector ; 2; 0; 21-Nov-2014; Active     • esomeprazole (NexIUM) 40 MG capsule Take 1 capsule by mouth.     • estradiol (ESTRACE) 0.1 MG/GM vaginal cream      • Fish Oil-Krill Oil (KRILL OIL PLUS) capsule Take  by mouth.     • fluocinonide (LIDEX) 0.05 % external solution      • gabapentin (NEURONTIN) 100 MG capsule 1 capsule Daily.     • montelukast (SINGULAIR) 10 MG tablet Take  by mouth.     • ondansetron (ZOFRAN) 4 MG tablet Take 1 tablet by mouth Every 8 (Eight) Hours As Needed for Nausea or Vomiting. 30 tablet 1   • Probiotic Product (ALIGN) capsule Take  by mouth.     • VENTOLIN  (90 BASE) MCG/ACT inhaler      • [DISCONTINUED] amLODIPine (NORVASC) 5 MG tablet TAKE 1 TABLET BY MOUTH EVERY DAY 90 tablet 1   • [DISCONTINUED] rosuvastatin (CRESTOR) 5 MG tablet Take 1 tablet by mouth Daily. 90 tablet 3   • [DISCONTINUED] celecoxib (CeleBREX) 200 MG capsule Take 1 capsule by mouth Daily. (Patient not taking: Reported on 5/30/2023)     • [DISCONTINUED] docusate sodium (COLACE) 100 MG capsule Take 1 capsule by mouth 2 (Two) Times a Day. (Patient not taking: Reported on 5/30/2023)       No current facility-administered medications on file prior to visit.       Past Medical History:   Diagnosis Date   • Achilles tendinitis of right lower extremity    • Esophageal stricture    • GERD (gastroesophageal reflux disease)    • Hemorrhoids 11/24/2015   • Hyperlipidemia    • Inflammatory fibroid polyps of stomach        Family History   Problem Relation Age of Onset   • Depression Mother    • Depression Brother    • Hypertension Daughter    • Hypertension Son        Social History     Socioeconomic History   • Marital status:    Tobacco Use   • Smoking status: Former   Substance and Sexual Activity   • Alcohol use: No   • Drug use: No   • Sexual activity: Never       Past Surgical History:   Procedure Laterality Date   • BREAST SURGERY     • CHOLECYSTECTOMY     • COLONOSCOPY     •  "INCISIONAL BREAST BIOPSY     • OOPHORECTOMY     • REPLACEMENT TOTAL KNEE Left    • TONSILLECTOMY AND ADENOIDECTOMY     • TOTAL SHOULDER ARTHROPLASTY         The following portions of the patient's history were reviewed and updated as appropriate: problem list, allergies, current medications, past medical history and past social history.    Review of Systems    Immunization History   Administered Date(s) Administered   • COVID-19 (PFIZER) Purple Cap Monovalent 02/04/2021, 02/25/2021, 04/11/2021, 10/23/2021   • Covid-19 (Pfizer) Gray Cap Monovalent 05/16/2022   • FLUAD TRI 65YR+ 10/06/2020   • Flu Vaccine Quad PF >36MO 09/18/2018   • Fluzone High Dose =>65 Years ([facility] ONLY) 10/01/2015, 09/18/2018, 10/08/2019   • Fluzone High-Dose 65+yrs 08/25/2021, 11/03/2022   • Hepatitis A 04/26/2018, 11/02/2018   • Influenza Quad Vaccine (Inpatient) 09/20/2017   • Influenza TIV (IM) 09/20/2017   • Influenza, Unspecified 10/15/2015   • Pneumococcal Conjugate 13-Valent (PCV13) 09/28/2015   • Pneumococcal Polysaccharide (PPSV23) 10/19/2020   • Shingrix 10/30/2019, 10/06/2020, 10/23/2020   • TD Preservative Free 07/15/2021   • Td, Unspecified 08/15/2006   • Tdap 09/06/2016   • Zostavax 02/15/2007       Objective   Vitals:    05/30/23 0821   BP: 118/72   Pulse: 88   Resp: 16   Weight: 107 kg (236 lb)   Height: 161.1 cm (63.43\")     Body mass index is 41.24 kg/m².  Physical Exam  Vitals reviewed.   Constitutional:       Appearance: Normal appearance. She is well-developed. She is obese.   HENT:      Head: Normocephalic and atraumatic.      Right Ear: Tympanic membrane, ear canal and external ear normal.      Ears:      Comments: Serous fluid noted behind left TM.      Mouth/Throat:      Mouth: Mucous membranes are moist.      Pharynx: Oropharynx is clear. No oropharyngeal exudate or posterior oropharyngeal erythema.   Neck:      Thyroid: No thyroid mass, thyromegaly or thyroid tenderness.   Cardiovascular:      Rate and Rhythm: Normal " rate and regular rhythm.      Heart sounds: Normal heart sounds, S1 normal and S2 normal.   Pulmonary:      Effort: Pulmonary effort is normal.      Breath sounds: Normal breath sounds.   Musculoskeletal:      Cervical back: Neck supple.      Comments: Ambulates without assistance; no clubbing, cyanosis or edema. FROM lumbar spine. Lumbar spine NTT percussion.    Skin:     General: Skin is warm and dry.   Neurological:      Mental Status: She is alert.   Psychiatric:         Mood and Affect: Mood normal.         Behavior: Behavior normal.         Procedures    Assessment & Plan   Diagnoses and all orders for this visit:    1. Essential hypertension (Primary)  Comments:  Controlled. Continue current medications. RTO 6 months.   Orders:  -     amLODIPine (NORVASC) 5 MG tablet; Take 1 tablet by mouth Daily.  Dispense: 90 tablet; Refill: 1    2. Subclinical hypothyroidism  Comments:  Repeat TSH and free T4 today.   Orders:  -     TSH; Future  -     T4, free; Future  -     TSH  -     T4, free    3. Prediabetes  Comments:  A1C improved to 5.5% with dietary changes and increased activity following RTK.   Orders:  -     Hemoglobin A1c; Future    4. Morbid obesity  Comments:  BMI 41.24. She acknowledes need for weight loss; goal 1/2-1 pound weekly over the next 6 months via 150 minutes weekly aerobic exercise & dietary changes.     5. Chronic midline low back pain without sciatica  Comments:  Discussed weight loss and PT; she declines PT at this time. No red flag Sx. She will work on stretches at home and let me know if no improvement or worsens.   Orders:  -     celecoxib (CeleBREX) 200 MG capsule; Take 1 capsule by mouth Daily.  Dispense: 90 capsule; Refill: 0    6. Hyperlipidemia, unspecified hyperlipidemia type  -     Lipid Panel; Future  -     Comprehensive Metabolic Panel; Future  -     rosuvastatin (CRESTOR) 5 MG tablet; Take 1 tablet by mouth Daily.  Dispense: 90 tablet; Refill: 3    7. Anemia, unspecified type  -      CBC (No Diff)    8. Colon cancer screening  Comments:  Schedule with Dr. Hernández.   Orders:  -     Ambulatory Referral For Screening Colonoscopy    Records reviewed include previous OV with myself as well as labs.     Return in about 6 months (around 12/4/2023) for Medicare Wellness, Lab B4 FUP, Lab Today.

## 2023-05-30 ENCOUNTER — OFFICE VISIT (OUTPATIENT)
Dept: INTERNAL MEDICINE | Facility: CLINIC | Age: 78
End: 2023-05-30

## 2023-05-30 VITALS
HEIGHT: 63 IN | BODY MASS INDEX: 41.82 KG/M2 | DIASTOLIC BLOOD PRESSURE: 72 MMHG | SYSTOLIC BLOOD PRESSURE: 118 MMHG | HEART RATE: 88 BPM | WEIGHT: 236 LBS | RESPIRATION RATE: 16 BRPM

## 2023-05-30 DIAGNOSIS — E78.5 HYPERLIPIDEMIA, UNSPECIFIED HYPERLIPIDEMIA TYPE: Chronic | ICD-10-CM

## 2023-05-30 DIAGNOSIS — Z12.11 COLON CANCER SCREENING: ICD-10-CM

## 2023-05-30 DIAGNOSIS — M54.50 CHRONIC MIDLINE LOW BACK PAIN WITHOUT SCIATICA: ICD-10-CM

## 2023-05-30 DIAGNOSIS — D64.9 ANEMIA, UNSPECIFIED TYPE: ICD-10-CM

## 2023-05-30 DIAGNOSIS — E03.8 SUBCLINICAL HYPOTHYROIDISM: ICD-10-CM

## 2023-05-30 DIAGNOSIS — R73.03 PREDIABETES: Chronic | ICD-10-CM

## 2023-05-30 DIAGNOSIS — I10 ESSENTIAL HYPERTENSION: Primary | Chronic | ICD-10-CM

## 2023-05-30 DIAGNOSIS — G89.29 CHRONIC MIDLINE LOW BACK PAIN WITHOUT SCIATICA: ICD-10-CM

## 2023-05-30 DIAGNOSIS — E66.01 MORBID OBESITY: Chronic | ICD-10-CM

## 2023-05-30 RX ORDER — ROSUVASTATIN CALCIUM 5 MG/1
5 TABLET, COATED ORAL DAILY
Qty: 90 TABLET | Refills: 3 | Status: SHIPPED | OUTPATIENT
Start: 2023-05-30

## 2023-05-30 RX ORDER — AMLODIPINE BESYLATE 5 MG/1
5 TABLET ORAL DAILY
Qty: 90 TABLET | Refills: 1 | Status: SHIPPED | OUTPATIENT
Start: 2023-05-30

## 2023-05-30 RX ORDER — CELECOXIB 200 MG/1
200 CAPSULE ORAL DAILY
Qty: 90 CAPSULE | Refills: 0 | Status: SHIPPED | OUTPATIENT
Start: 2023-05-30

## 2023-05-31 LAB
ERYTHROCYTE [DISTWIDTH] IN BLOOD BY AUTOMATED COUNT: 13 % (ref 12.3–15.4)
HCT VFR BLD AUTO: 43.2 % (ref 34–46.6)
HGB BLD-MCNC: 13.8 G/DL (ref 12–15.9)
MCH RBC QN AUTO: 28.3 PG (ref 26.6–33)
MCHC RBC AUTO-ENTMCNC: 31.9 G/DL (ref 31.5–35.7)
MCV RBC AUTO: 88.7 FL (ref 79–97)
PLATELET # BLD AUTO: 395 10*3/MM3 (ref 140–450)
RBC # BLD AUTO: 4.87 10*6/MM3 (ref 3.77–5.28)
T4 FREE SERPL-MCNC: 1.15 NG/DL (ref 0.93–1.7)
TSH SERPL DL<=0.005 MIU/L-ACNC: 4.51 UIU/ML (ref 0.27–4.2)
WBC # BLD AUTO: 6.87 10*3/MM3 (ref 3.4–10.8)

## 2023-06-02 ENCOUNTER — HOSPITAL ENCOUNTER (OUTPATIENT)
Dept: CT IMAGING | Facility: HOSPITAL | Age: 78
Discharge: HOME | End: 2023-06-02

## 2023-06-02 DIAGNOSIS — J18.9 PNEUMONIA OF RIGHT MIDDLE LOBE DUE TO INFECTIOUS ORGANISM: ICD-10-CM

## 2023-06-02 PROCEDURE — 71250 CT THORAX DX C-: CPT

## 2023-06-05 DIAGNOSIS — R93.89 ABNORMAL CHEST CT: Primary | ICD-10-CM

## 2023-10-05 ENCOUNTER — OFFICE VISIT (OUTPATIENT)
Dept: INTERNAL MEDICINE | Facility: CLINIC | Age: 78
End: 2023-10-05
Payer: MEDICARE

## 2023-10-05 VITALS — TEMPERATURE: 98.3 F | HEIGHT: 63 IN | BODY MASS INDEX: 42.52 KG/M2 | WEIGHT: 240 LBS

## 2023-10-05 DIAGNOSIS — Z23 FLU VACCINE NEED: ICD-10-CM

## 2023-10-05 DIAGNOSIS — R26.89 IMBALANCE: ICD-10-CM

## 2023-10-05 DIAGNOSIS — M54.42 CHRONIC MIDLINE LOW BACK PAIN WITH LEFT-SIDED SCIATICA: Primary | ICD-10-CM

## 2023-10-05 DIAGNOSIS — G89.29 CHRONIC MIDLINE LOW BACK PAIN WITH LEFT-SIDED SCIATICA: Primary | ICD-10-CM

## 2023-10-05 RX ORDER — CELECOXIB 200 MG/1
200 CAPSULE ORAL DAILY
Qty: 90 CAPSULE | Refills: 3 | Status: SHIPPED | OUTPATIENT
Start: 2023-10-05

## 2023-10-05 RX ORDER — CYCLOBENZAPRINE HCL 5 MG
5 TABLET ORAL 3 TIMES DAILY PRN
Qty: 30 TABLET | Refills: 0 | Status: SHIPPED | OUTPATIENT
Start: 2023-10-05

## 2023-10-05 NOTE — PROGRESS NOTES
"Subjective   Chief Complaint   Patient presents with    Back Pain       History of Present Illness   78 y.o. female presents with cc back pain ongoing times 5 days after road trip to [place] and [employer]. Low back has been ongoing for many years but now has burning pain down into back of left leg and to to the front left thigh. Stops at the knee. She ran out of Celebrex several days ago so she has been taking Ibuprofen 600 mg once daily. NKI, fall, or change in activity. Denies weakness. Denies B/B incontinence. No heat or ice.     No longer taking Xanax. Gabapentin started for her knee by ortho. \"It doesn't do anything.\"     Patient Active Problem List   Diagnosis    Mixed anxiety depressive disorder    Hyperlipidemia    Prediabetes    Obesity (BMI 30-39.9)    Nausea    Hypersomnia with sleep apnea    Asthma    GERD (gastroesophageal reflux disease)    Arthritis of left knee    Essential hypertension    Subclinical hypothyroidism    Anemia       Allergies   Allergen Reactions    Adhesive Tape Hives    Pantoprazole Nausea Only     Other reaction(s): Headache    Penicillins Hives    Nickel Rash     With cheap jewelry    Troleandomycin Hives       Current Outpatient Medications on File Prior to Visit   Medication Sig Dispense Refill    amLODIPine (NORVASC) 5 MG tablet Take 1 tablet by mouth Daily. 90 tablet 1    Azelastine-Fluticasone 137-50 MCG/ACT suspension       budesonide-formoterol (SYMBICORT) 160-4.5 MCG/ACT inhaler Symbicort 160-4.5 MCG/ACT Inhalation Aerosol; Patient Sig: Symbicort 160-4.5 MCG/ACT Inhalation Aerosol ; 10; 0; 28-Jul-2015; Active      buPROPion XL (WELLBUTRIN XL) 150 MG 24 hr tablet       citalopram (CeleXA) 20 MG tablet       Dupixent 300 MG/2ML solution pen-injector       EPINEPHrine (EPIPEN) 0.3 MG/0.3ML solution auto-injector injection EpiPen 2-Mitch 0.3 MG/0.3ML Injection Solution Auto-injector; Patient Sig: EpiPen 2-Mitch 0.3 MG/0.3ML Injection Solution Auto-injector ; 2; 0; " 21-Nov-2014; Active      esomeprazole (NexIUM) 40 MG capsule Take 1 capsule by mouth.      estradiol (ESTRACE) 0.1 MG/GM vaginal cream       Fish Oil-Krill Oil (KRILL OIL PLUS) capsule Take  by mouth.      fluocinonide (LIDEX) 0.05 % external solution       gabapentin (NEURONTIN) 100 MG capsule 1 capsule Daily.      montelukast (SINGULAIR) 10 MG tablet Take  by mouth.      ondansetron (ZOFRAN) 4 MG tablet Take 1 tablet by mouth Every 8 (Eight) Hours As Needed for Nausea or Vomiting. 30 tablet 1    Probiotic Product (ALIGN) capsule Take  by mouth.      rosuvastatin (CRESTOR) 5 MG tablet Take 1 tablet by mouth Daily. 90 tablet 3    VENTOLIN  (90 BASE) MCG/ACT inhaler       [DISCONTINUED] ALPRAZolam (XANAX) 0.5 MG tablet Take 1 tablet by mouth.      [DISCONTINUED] celecoxib (CeleBREX) 200 MG capsule Take 1 capsule by mouth Daily. 90 capsule 0    [DISCONTINUED] aspirin 81 MG EC tablet Take 1 tablet by mouth 2 (Two) Times a Day.       No current facility-administered medications on file prior to visit.       Past Medical History:   Diagnosis Date    Achilles tendinitis of right lower extremity     Esophageal stricture     GERD (gastroesophageal reflux disease)     Hemorrhoids 11/24/2015    Hyperlipidemia     Inflammatory fibroid polyps of stomach        Family History   Problem Relation Age of Onset    Depression Mother     Depression Brother     Hypertension Daughter     Hypertension Son        Social History     Socioeconomic History    Marital status:    Tobacco Use    Smoking status: Former   Substance and Sexual Activity    Alcohol use: No    Drug use: No    Sexual activity: Never       Past Surgical History:   Procedure Laterality Date    BREAST SURGERY      CHOLECYSTECTOMY      COLONOSCOPY      INCISIONAL BREAST BIOPSY      OOPHORECTOMY      REPLACEMENT TOTAL KNEE Left     TONSILLECTOMY AND ADENOIDECTOMY      TOTAL SHOULDER ARTHROPLASTY         The following portions of the patient's history were  "reviewed and updated as appropriate: problem list, allergies, current medications, past medical history, and past social history.    Review of Systems    Immunization History   Administered Date(s) Administered    COVID-19 (PFIZER) Purple Cap Monovalent 02/04/2021, 02/25/2021, 04/11/2021, 10/23/2021    Covid-19 (Pfizer) Gray Cap Monovalent 05/16/2022    FLUAD TRI 65YR+ 10/06/2020    Flu Vaccine Quad PF >36MO 09/18/2018    Fluzone High Dose =>65 Years ([facility] ONLY) 10/01/2015, 09/18/2018, 10/08/2019    Fluzone High-Dose 65+yrs 08/25/2021, 11/03/2022, 10/05/2023    Hepatitis A 04/26/2018, 11/02/2018    Influenza Quad Vaccine (Inpatient) 09/20/2017    Influenza TIV (IM) 09/20/2017    Influenza, Unspecified 10/15/2015    Pneumococcal Conjugate 13-Valent (PCV13) 09/28/2015    Pneumococcal Polysaccharide (PPSV23) 10/19/2020    Shingrix 10/30/2019, 10/06/2020, 10/23/2020    TD Preservative Free (Tenivac) 07/15/2021    Td, Unspecified 08/15/2006    Tdap 09/06/2016    Zostavax 02/15/2007       Objective   Vitals:    10/05/23 1007   Temp: 98.3 °F (36.8 °C)   Weight: 109 kg (240 lb)   Height: 161.1 cm (63.43\")     Body mass index is 41.95 kg/m².  Physical Exam  Constitutional:       Appearance: Normal appearance. She is obese.   HENT:      Head: Normocephalic and atraumatic.   Pulmonary:      Effort: Pulmonary effort is normal.   Musculoskeletal:      Right lower leg: No edema.      Left lower leg: No edema.      Comments: Pain with lumbar extension and right lateral bend.    Skin:     General: Skin is warm and dry.   Neurological:      Mental Status: She is alert.   Psychiatric:         Mood and Affect: Mood normal.         Behavior: Behavior normal.       Procedures    Assessment & Plan   Diagnoses and all orders for this visit:    1. Chronic midline low back pain with left-sided sciatica (Primary)  Comments:  Refill Celebrex 200 mg daily--she can take this bid for one week. PT advised. Understands Flexeril can cause " fatigue and will only take at night. Moist heat 15-20 min qid. Call with any questions or concerns.   Orders:  -     celecoxib (CeleBREX) 200 MG capsule; Take 1 capsule by mouth Daily.  Dispense: 90 capsule; Refill: 3  -     Ambulatory Referral to Physical Therapy Evaluate and treat  -     cyclobenzaprine (FLEXERIL) 5 MG tablet; Take 1 tablet by mouth 3 (Three) Times a Day As Needed for Muscle Spasms.  Dispense: 30 tablet; Refill: 0    2. Imbalance  Comments:  After having knee replaced.  Orders:  -     Ambulatory Referral to Physical Therapy Evaluate and treat    3. Flu vaccine need  -     Fluzone High-Dose 65+yrs (1336-4519)    Records reviewed include previous OV with myself as well as labs.     Return for Next scheduled follow up.

## 2023-11-27 NOTE — PROGRESS NOTES
The ABCs of the Annual Wellness Visit  Subsequent Medicare Wellness Visit    Subjective    Luzmaria Pérez is a 78 y.o. female who presents for a Subsequent Medicare Wellness Visit.    The following portions of the patient's history were reviewed and   updated as appropriate: allergies, current medications, past family history, past medical history, past social history, past surgical history, and problem list.    Compared to one year ago, the patient feels her physical   health is the same.    Compared to one year ago, the patient feels her mental   health is the same.    Recent Hospitalizations:  She was not admitted to the hospital during the last year.       Current Medical Providers:  Patient Care Team:  Keiry Means APRN as PCP - General (Family Medicine)    Outpatient Medications Prior to Visit   Medication Sig Dispense Refill    Azelastine-Fluticasone 137-50 MCG/ACT suspension       budesonide-formoterol (SYMBICORT) 160-4.5 MCG/ACT inhaler Symbicort 160-4.5 MCG/ACT Inhalation Aerosol; Patient Sig: Symbicort 160-4.5 MCG/ACT Inhalation Aerosol ; 10; 0; 28-Jul-2015; Active      buPROPion XL (WELLBUTRIN XL) 150 MG 24 hr tablet       citalopram (CeleXA) 20 MG tablet       Dupixent 300 MG/2ML solution pen-injector       EPINEPHrine (EPIPEN) 0.3 MG/0.3ML solution auto-injector injection EpiPen 2-Mitch 0.3 MG/0.3ML Injection Solution Auto-injector; Patient Sig: EpiPen 2-Mitch 0.3 MG/0.3ML Injection Solution Auto-injector ; 2; 0; 21-Nov-2014; Active      esomeprazole (NexIUM) 40 MG capsule Take 1 capsule by mouth.      estradiol (ESTRACE) 0.1 MG/GM vaginal cream       Fish Oil-Krill Oil (KRILL OIL PLUS) capsule Take  by mouth.      fluocinonide (LIDEX) 0.05 % external solution       gabapentin (NEURONTIN) 300 MG capsule       montelukast (SINGULAIR) 10 MG tablet Take  by mouth.      Probiotic Product (ALIGN) capsule Take  by mouth.      VENTOLIN  (90 BASE) MCG/ACT inhaler       amLODIPine (NORVASC) 5 MG  "tablet Take 1 tablet by mouth Daily. 90 tablet 1    gabapentin (NEURONTIN) 100 MG capsule 1 capsule Daily.      rosuvastatin (CRESTOR) 5 MG tablet Take 1 tablet by mouth Daily. 90 tablet 3    celecoxib (CeleBREX) 200 MG capsule Take 1 capsule by mouth Daily. 90 capsule 3    cyclobenzaprine (FLEXERIL) 5 MG tablet Take 1 tablet by mouth 3 (Three) Times a Day As Needed for Muscle Spasms. 30 tablet 0    methylPREDNISolone (MEDROL) 4 MG dose pack Take as directed on package instructions. 21 tablet 0    ondansetron (ZOFRAN) 4 MG tablet Take 1 tablet by mouth Every 8 (Eight) Hours As Needed for Nausea or Vomiting. (Patient not taking: Reported on 11/30/2023) 30 tablet 1     No facility-administered medications prior to visit.       No opioid medication identified on active medication list. I have reviewed chart for other potential  high risk medication/s and harmful drug interactions in the elderly.        Aspirin is not on active medication list.  Aspirin use is not indicated based on review of current medical condition/s. Risk of harm outweighs potential benefits.  .    Patient Active Problem List   Diagnosis    Mixed anxiety depressive disorder    Hyperlipidemia    Prediabetes    Obesity (BMI 30-39.9)    Nausea    Hypersomnia with sleep apnea    Asthma    GERD (gastroesophageal reflux disease)    Arthritis of left knee    Essential hypertension    Subclinical hypothyroidism    Anemia     Advance Care Planning   Advance Care Planning     Advance Directive is not on file.  ACP discussion was held with the patient during this visit. Patient does not have an advance directive, information provided.     Objective    Vitals:    11/30/23 0744   BP: 122/82   Pulse: 84   Resp: 16   Temp: 97.6 °F (36.4 °C)   Weight: 110 kg (242 lb)   Height: 161.1 cm (63.43\")     Estimated body mass index is 42.3 kg/m² as calculated from the following:    Height as of this encounter: 161.1 cm (63.43\").    Weight as of this encounter: 110 kg (242 " lb).           Does the patient have evidence of cognitive impairment? No    Lab Results   Component Value Date    CHLPL 196 2023    TRIG 86 2023    HDL 94 (H) 2023    LDL 87 2023    VLDL 15 2023    HGBA1C 5.60 2023        HEALTH RISK ASSESSMENT    Smoking Status:  Social History     Tobacco Use   Smoking Status Former   Smokeless Tobacco Not on file     Alcohol Consumption:  Social History     Substance and Sexual Activity   Alcohol Use No     Fall Risk Screen:    STEADI Fall Risk Assessment was completed, and patient is at MODERATE risk for falls. Assessment completed on:2023    Depression Screenin/30/2023     7:49 AM   PHQ-2/PHQ-9 Depression Screening   Little Interest or Pleasure in Doing Things 0-->not at all   Feeling Down, Depressed or Hopeless 0-->not at all   PHQ-9: Brief Depression Severity Measure Score 0       Health Habits and Functional and Cognitive Screenin/30/2023     7:48 AM   Functional & Cognitive Status   Do you have difficulty preparing food and eating? No   Do you have difficulty bathing yourself, getting dressed or grooming yourself? No   Do you have difficulty using the toilet? No   Do you have difficulty moving around from place to place? No   Do you have trouble with steps or getting out of a bed or a chair? No   Current Diet Well Balanced Diet   Dental Exam Up to date   Eye Exam Up to date   Exercise (times per week) 3 times per week   Current Exercises Include Walking   Do you need help using the phone?  No   Are you deaf or do you have serious difficulty hearing?  No   Do you need help to go to places out of walking distance? No   Do you need help shopping? No   Do you need help preparing meals?  No   Do you need help with housework?  No   Do you need help with laundry? No   Do you need help taking your medications? No   Do you need help managing money? No   Do you ever drive or ride in a car without wearing a seat belt? No    Have you felt unusual stress, anger or loneliness in the last month? No   Who do you live with? Alone   If you need help, do you have trouble finding someone available to you? No   Have you been bothered in the last four weeks by sexual problems? No   Do you have difficulty concentrating, remembering or making decisions? No       Age-appropriate Screening Schedule:  Refer to the list below for future screening recommendations based on patient's age, sex and/or medical conditions. Orders for these recommended tests are listed in the plan section. The patient has been provided with a written plan.    Health Maintenance   Topic Date Due    COLORECTAL CANCER SCREENING  01/17/2022    COVID-19 Vaccine (6 - 2023-24 season) 09/01/2023    DXA SCAN  12/31/2024 (Originally 9/16/2021)    BMI FOLLOWUP  12/15/2023    LIPID PANEL  11/28/2024    ANNUAL WELLNESS VISIT  11/30/2024    TDAP/TD VACCINES (3 - Td or Tdap) 07/15/2031    HEPATITIS C SCREENING  Completed    INFLUENZA VACCINE  Completed    Pneumococcal Vaccine 65+  Completed    ZOSTER VACCINE  Completed    MAMMOGRAM  Discontinued                  CMS Preventative Services Quick Reference  Risk Factors Identified During Encounter  CRC screening  The above risks/problems have been discussed with the patient.  Pertinent information has been shared with the patient in the After Visit Summary.  An After Visit Summary and PPPS were made available to the patient.    Follow Up:   Next Medicare Wellness visit to be scheduled in 1 year.       Additional E&M Note during same encounter follows:  Patient has multiple medical problems which are significant and separately identifiable that require additional work above and beyond the Medicare Wellness Visit.      Chief Complaint  Medicare Wellness-subsequent    Subjective        HPI  Luzmaria Pérez is also being seen today for HTN, subclinical hypothyroidism, obesity, prediabetes and HLD. Feeling good without complaints. Sent the paperwork  "in for her CLS to Dr. Hernández. She started Astelin with Dr. Frausto for PND and would like to get off of Gabapentin--psych started it years ago. Walking and doing more stairs. She fell several weeks ago after her shoe caught the edge of a metal strip. No injury, loss of balance or dizziness. Energy is good. Denies chest pain. Dyspnea unchanged--doing well with her inhalers and Dupixent.          Objective   Vital Signs:  /82   Pulse 84   Temp 97.6 °F (36.4 °C)   Resp 16   Ht 161.1 cm (63.43\")   Wt 110 kg (242 lb)   BMI 42.30 kg/m²     Physical Exam  Vitals reviewed.   Constitutional:       Appearance: Normal appearance. She is well-developed. She is obese.   HENT:      Head: Normocephalic and atraumatic.      Right Ear: Tympanic membrane, ear canal and external ear normal.      Left Ear: Tympanic membrane, ear canal and external ear normal.      Nose: Nose normal.      Mouth/Throat:      Mouth: Mucous membranes are moist.      Pharynx: Oropharynx is clear. No oropharyngeal exudate or posterior oropharyngeal erythema.   Eyes:      General: No scleral icterus.     Extraocular Movements: Extraocular movements intact.      Conjunctiva/sclera: Conjunctivae normal.      Pupils: Pupils are equal, round, and reactive to light.   Neck:      Thyroid: No thyromegaly.      Vascular: No carotid bruit.   Cardiovascular:      Rate and Rhythm: Normal rate and regular rhythm.      Heart sounds: Normal heart sounds. No murmur heard.  Pulmonary:      Effort: Pulmonary effort is normal.      Breath sounds: Normal breath sounds.   Abdominal:      General: Bowel sounds are normal. There is no distension.      Palpations: Abdomen is soft.      Tenderness: There is no abdominal tenderness.   Musculoskeletal:      Cervical back: Neck supple.      Comments: Ambulates without assistance; no clubbing, cyanosis or edema.    Lymphadenopathy:      Cervical: No cervical adenopathy.   Skin:     General: Skin is warm and dry. "   Neurological:      Mental Status: She is alert.   Psychiatric:         Mood and Affect: Mood normal.         Behavior: Behavior normal.                         Assessment and Plan   Diagnoses and all orders for this visit:    1. Medicare annual wellness visit, subsequent (Primary)  Comments:  150 minutes weekly aerobic exercise advised. Flu shot UTD.    2. Essential hypertension  Comments:  Stable. Continue current medications. Weight loss encouraged. RTO 6M.  Orders:  -     amLODIPine (NORVASC) 5 MG tablet; Take 1 tablet by mouth Daily.  Dispense: 90 tablet; Refill: 1    3. Obesity (BMI 30-39.9)  Comments:  Weight loss encouraged via dietary changes and 150 minutes aerobic exercise weekly.    4. Prediabetes  Comments:  Stable with A1C 5.6%. LSM advised.    5. Hyperlipidemia, unspecified hyperlipidemia type  Comments:  Stable. Continue Crestor 5 mg daily.  Orders:  -     rosuvastatin (CRESTOR) 5 MG tablet; Take 1 tablet by mouth Daily.  Dispense: 90 tablet; Refill: 3    6. Subclinical hypothyroidism  Comments:  Stable. Continue to monitor every 6-12M.    Records reviewed include previous OV with myself as well as labs.        Follow Up   Return in about 6 months (around 5/30/2024) for 30.  Patient was given instructions and counseling regarding her condition or for health maintenance advice. Please see specific information pulled into the AVS if appropriate.

## 2023-11-30 ENCOUNTER — OFFICE VISIT (OUTPATIENT)
Dept: INTERNAL MEDICINE | Facility: CLINIC | Age: 78
End: 2023-11-30
Payer: MEDICARE

## 2023-11-30 ENCOUNTER — TELEPHONE (OUTPATIENT)
Dept: INTERNAL MEDICINE | Facility: CLINIC | Age: 78
End: 2023-11-30

## 2023-11-30 VITALS
HEIGHT: 63 IN | RESPIRATION RATE: 16 BRPM | SYSTOLIC BLOOD PRESSURE: 122 MMHG | BODY MASS INDEX: 42.88 KG/M2 | DIASTOLIC BLOOD PRESSURE: 82 MMHG | TEMPERATURE: 97.6 F | HEART RATE: 84 BPM | WEIGHT: 242 LBS

## 2023-11-30 DIAGNOSIS — R73.03 PREDIABETES: ICD-10-CM

## 2023-11-30 DIAGNOSIS — I10 ESSENTIAL HYPERTENSION: Chronic | ICD-10-CM

## 2023-11-30 DIAGNOSIS — E03.8 SUBCLINICAL HYPOTHYROIDISM: ICD-10-CM

## 2023-11-30 DIAGNOSIS — Z00.00 MEDICARE ANNUAL WELLNESS VISIT, SUBSEQUENT: Primary | ICD-10-CM

## 2023-11-30 DIAGNOSIS — E78.5 HYPERLIPIDEMIA, UNSPECIFIED HYPERLIPIDEMIA TYPE: Chronic | ICD-10-CM

## 2023-11-30 DIAGNOSIS — E66.9 OBESITY (BMI 30-39.9): Chronic | ICD-10-CM

## 2023-11-30 RX ORDER — ROSUVASTATIN CALCIUM 5 MG/1
5 TABLET, COATED ORAL DAILY
Qty: 90 TABLET | Refills: 3 | Status: SHIPPED | OUTPATIENT
Start: 2023-11-30

## 2023-11-30 RX ORDER — AMLODIPINE BESYLATE 5 MG/1
5 TABLET ORAL DAILY
Qty: 90 TABLET | Refills: 1 | Status: SHIPPED | OUTPATIENT
Start: 2023-11-30

## 2023-11-30 RX ORDER — GABAPENTIN 300 MG/1
CAPSULE ORAL
COMMUNITY
Start: 2023-10-25

## 2023-12-04 ENCOUNTER — HOSPITAL ENCOUNTER (OUTPATIENT)
Dept: CT IMAGING | Facility: HOSPITAL | Age: 78
Discharge: HOME | End: 2023-12-04
Admitting: NURSE PRACTITIONER
Payer: MEDICARE

## 2023-12-04 DIAGNOSIS — R93.89 ABNORMAL CHEST CT: ICD-10-CM

## 2023-12-04 PROCEDURE — 71250 CT THORAX DX C-: CPT

## 2023-12-06 DIAGNOSIS — N28.9 LESION OF LEFT NATIVE KIDNEY: ICD-10-CM

## 2023-12-06 DIAGNOSIS — R93.89 ABNORMAL CHEST CT: Primary | ICD-10-CM

## 2023-12-06 DIAGNOSIS — I77.810 ASCENDING AORTA DILATION: ICD-10-CM

## 2023-12-06 DIAGNOSIS — I25.10 CORONARY ARTERY CALCIFICATION SEEN ON CT SCAN: ICD-10-CM

## 2023-12-06 RX ORDER — ASPIRIN 81 MG/1
81 TABLET ORAL DAILY
Start: 2023-12-06

## 2023-12-06 RX ORDER — ROSUVASTATIN CALCIUM 10 MG/1
10 TABLET, COATED ORAL DAILY
Qty: 90 TABLET | Refills: 3 | Status: SHIPPED | OUTPATIENT
Start: 2023-12-06

## 2023-12-13 ENCOUNTER — OFFICE VISIT (OUTPATIENT)
Dept: OTHER | Facility: HOSPITAL | Age: 78
End: 2023-12-13
Payer: MEDICARE

## 2023-12-13 VITALS
DIASTOLIC BLOOD PRESSURE: 82 MMHG | HEART RATE: 75 BPM | OXYGEN SATURATION: 96 % | TEMPERATURE: 96.7 F | HEIGHT: 63 IN | WEIGHT: 242 LBS | BODY MASS INDEX: 42.88 KG/M2 | SYSTOLIC BLOOD PRESSURE: 162 MMHG

## 2023-12-13 DIAGNOSIS — R91.1 LUNG NODULE: Primary | ICD-10-CM

## 2023-12-13 PROCEDURE — G0463 HOSPITAL OUTPT CLINIC VISIT: HCPCS

## 2023-12-13 NOTE — PROGRESS NOTES
[facility]        Patient Identification:  Name: Luzmaria Pérez  Age: 78 y.o.  Sex: female  :  1945  MRN: [id number]                     Primary Care Physician: Keiry Means APRN    Reason for visit:   Lung nodule    History of Present Illness:   78-year-old female here for abnormal CT chest.  She denies night sweats, hemoptysis or unintentional weight loss.  She was a previous smoker and quit several years ago.  CT chest 2023 personally and independently viewed by me and compared to previous CT scan in March.  I have shown her and family images today and questions answered to their satisfaction.  7 mm solid nodule left lower lobe slightly more conspicuous than previous.  Somewhat nodular opacity in the left lower lobe and mild reticular opacities in the periphery of the lower lobes are slightly progressed as well.    Past Medical History:  Past Medical History:   Diagnosis Date    Achilles tendinitis of right lower extremity     Esophageal stricture     GERD (gastroesophageal reflux disease)     Hemorrhoids 2015    Hyperlipidemia     Inflammatory fibroid polyps of stomach      Past Surgical History:  Past Surgical History:   Procedure Laterality Date    BREAST SURGERY      CHOLECYSTECTOMY      COLONOSCOPY      INCISIONAL BREAST BIOPSY      OOPHORECTOMY      REPLACEMENT TOTAL KNEE Left     TONSILLECTOMY AND ADENOIDECTOMY      TOTAL SHOULDER ARTHROPLASTY        Home Meds:      Current Outpatient Medications:     amLODIPine (NORVASC) 5 MG tablet, Take 1 tablet by mouth Daily., Disp: 90 tablet, Rfl: 1    aspirin 81 MG EC tablet, Take 1 tablet by mouth Daily., Disp: , Rfl:     Azelastine-Fluticasone 137-50 MCG/ACT suspension, , Disp: , Rfl:     budesonide-formoterol (SYMBICORT) 160-4.5 MCG/ACT inhaler, Symbicort 160-4.5 MCG/ACT Inhalation Aerosol; Patient Sig: Symbicort 160-4.5 MCG/ACT Inhalation Aerosol ; 10; 0; -2015; Active, Disp: , Rfl:     buPROPion XL (WELLBUTRIN XL)  150 MG 24 hr tablet, , Disp: , Rfl:     citalopram (CeleXA) 20 MG tablet, , Disp: , Rfl:     Dupixent 300 MG/2ML solution pen-injector, , Disp: , Rfl:     EPINEPHrine (EPIPEN) 0.3 MG/0.3ML solution auto-injector injection, EpiPen 2-Mitch 0.3 MG/0.3ML Injection Solution Auto-injector; Patient Sig: EpiPen 2-Mitch 0.3 MG/0.3ML Injection Solution Auto-injector ; 2; 0; 21-Nov-2014; Active, Disp: , Rfl:     esomeprazole (NexIUM) 40 MG capsule, Take 1 capsule by mouth., Disp: , Rfl:     estradiol (ESTRACE) 0.1 MG/GM vaginal cream, , Disp: , Rfl:     Fish Oil-Krill Oil (KRILL OIL PLUS) capsule, Take  by mouth., Disp: , Rfl:     fluocinonide (LIDEX) 0.05 % external solution, , Disp: , Rfl:     gabapentin (NEURONTIN) 300 MG capsule, , Disp: , Rfl:     montelukast (SINGULAIR) 10 MG tablet, Take  by mouth., Disp: , Rfl:     Probiotic Product (ALIGN) capsule, Take  by mouth., Disp: , Rfl:     rosuvastatin (Crestor) 10 MG tablet, Take 1 tablet by mouth Daily., Disp: 90 tablet, Rfl: 3    VENTOLIN  (90 BASE) MCG/ACT inhaler, , Disp: , Rfl:        Allergies:  Allergies   Allergen Reactions    Adhesive Tape Hives    Pantoprazole Nausea Only     Other reaction(s): Headache    Penicillins Hives    Nickel Rash     With cheap jewelry    Troleandomycin Hives     Immunizations:  Immunization History   Administered Date(s) Administered    COVID-19 (PFIZER) Purple Cap Monovalent 02/04/2021, 02/25/2021, 04/11/2021, 10/23/2021    Covid-19 (Pfizer) Gray Cap Monovalent 05/16/2022    FLUAD TRI 65YR+ 10/06/2020    Flu Vaccine Quad PF >36MO 09/18/2018    Fluzone High Dose =>65 Years (Vaxcare ONLY) 10/01/2015, 09/18/2018, 10/08/2019    Fluzone High-Dose 65+yrs 08/25/2021, 11/03/2022, 10/05/2023    Hepatitis A 04/26/2018, 11/02/2018    Influenza Quad Vaccine (Inpatient) 09/20/2017    Influenza TIV (IM) 09/20/2017    Influenza, Unspecified 10/15/2015    Pneumococcal Conjugate 13-Valent (PCV13) 09/28/2015    Pneumococcal Polysaccharide (PPSV23)  "10/19/2020    Shingrix 10/30/2019, 10/06/2020, 10/23/2020    TD Preservative Free (Tenivac) 07/15/2021    Td, Unspecified 08/15/2006    Tdap 09/06/2016    Zostavax 02/15/2007     Social History:   Social History     Social History Narrative    Not on file     Social History     Tobacco Use    Smoking status: Former    Smokeless tobacco: Not on file   Substance Use Topics    Alcohol use: No     Family History:  Family History   Problem Relation Age of Onset    Depression Mother     Depression Brother     Hypertension Daughter     Hypertension Son         Review of Systems  All below listed negative with exceptions of what is noted in the above mentioned history.  Denies fevers or chills  Denies nausea or vomiting  No new vision or hearing changes  No chest pain  No productive cough or shortness of breath  No diarrhea, hematemesis or hematochezia, no dysuria or frequency  No musculoskeletal complaints  No heat or cold intolerance  No skin rashes  No dizziness or confusion.  No seizure activity  No new anxiety or depression  12 system review of systems performed and all else negative    Objective:  tMax 24 hrs: @TMAX(24)@  Vitals Ranges:   Temp:  [96.7 °F (35.9 °C)] 96.7 °F (35.9 °C)  Heart Rate:  [75] 75  BP: (162)/(82) 162/82      Exam:  /82   Pulse 75   Temp 96.7 °F (35.9 °C) (Temporal)   Ht 161.1 cm (63.43\")   Wt 110 kg (242 lb)   SpO2 96%   BMI 42.29 kg/m²     GENERAL APPEARANCE:   Well developed  Well nourished  No acute distress   EYES:    PERRL                                                                           Conjunctiva normal  Sclera non-icteric.  HENT:   Atraumatic, normocephalic  External ears and nose normal  Moist mucous membranes and no ulcers  NECK:  Thyroid not enlarged  Trachea midline   RESPIRATORY:    Nonlabored breathing   Normal breath sounds  No rales. No wheezing  No dullness  CARDIOVASCULAR:    RRR  Normal S1, S2  No murmur  Lower extremity edema: none    Peripheral pulses " "present.    GI:   Bowel sounds normal  Abdomen soft , non-distended, non-tender  MUSCULOSKELETAL:  Normal movement of extremities  No tenderness, no deformities  No clubbing or cyanosis   Skin:    No visible rashes  No palpable nodules.  No mottling.   PSYCHIATRIC:  Normal mood and affect  Oriented to person, place and time  NEUROLOGIC:   Cranial nerves II through XII grossly intact.  Sensation intact.  .     Data Review:  Labs reviewed        Invalid input(s): \"LABALBU\", \"PROT\"              Imaging reviewed  CT Chest Without Contrast Diagnostic    Result Date: 12/6/2023  CT CHEST WO CONTRAST DIAGNOSTIC-  HISTORY: Follow-up abnormal CT chest. Shortness of breath.  TECHNIQUE: CT of the chest was performed without intravenous contrast. Reformatted images were reviewed. Radiation dose reduction techniques were utilized, including automated exposure control and exposure modulation based on body size.  COMPARISON: CT chest 6/9/2023   FINDINGS:   No pathologically enlarged thoracic lymph nodes are identified. Heart size is normal. There is no pericardial effusion. There is mild calcific coronary artery atherosclerosis. There is mild calcific aortic atherosclerosis. The ascending aorta is mildly dilated to 3.6 cm. Pleural spaces are clear. Limited imaging through the upper abdomen demonstrates a few hypodense foci in the liver, which are not significantly changed. There are surgical clips at the gallbladder fossa. There is calcific atherosclerosis. There is a partially imaged and incompletely characterized exophytic fluid density left renal lesion, which measures at least 5.4 cm. This may reflect a cyst. There is a tiny hiatal hernia versus prominent distal esophageal vestibule. There is a partially imaged right shoulder arthroplasty. There is multilevel degenerative disc disease. The trachea is largely clear. There is mild curvilinear atelectasis or scarring in the right lower lobe. Previously noted posterolateral left " upper lobe opacity has resolved. A 0.7 cm solid nodule in the lateral left lower lobe is similar in size but slightly more conspicuous. A few additional scattered punctate nodules are not significantly changed. There is new somewhat nodular opacity in the posteromedial left lower lobe (image 80). Mild reticular opacities in the peripheral lower lobes are slightly progressed.       Impression:  1.  New somewhat nodular opacity in the left lower lobe. A 0.7 cm left lower lobe nodule is similar in size but slightly more conspicuous. A few additional scattered punctate nodules are not significantly changed. Recommend follow-up CT chest in 3 to 6 months. 2.  Partially imaged and incompletely characterized left renal lesion, as above. Recommend renal ultrasound.     This report was finalized on 12/6/2023 12:44 PM by Dr. Glenda Rick M.D on Workstation: [id number]              Assessment:  Indeterminate pulmonary nodules  History of tobacco use    Plan:  Plan for ion protocol CT chest 3 months from previous for follow-up.  I will see her back in our office after CT is completed and we will obtain pulmonary function testing at that time.    Dax Bobo MD  [facility], [facility]  Pulmonary and Critical Care Medicine    12/13/2023  13:46 EST

## 2023-12-13 NOTE — PATIENT INSTRUCTIONS
Thank you for choosing [facility] for your office visit today.  You were seen by Dr. aDx Bobo in the [facility], an outpatient service of [facility].     You will be contacted by the [facility] office staff to get your follow up appointment and/or testing appointments scheduled. If you have any questions, please reach out to [facility] at [phone]

## 2023-12-26 ENCOUNTER — HOSPITAL ENCOUNTER (OUTPATIENT)
Dept: ULTRASOUND IMAGING | Facility: HOSPITAL | Age: 78
Discharge: HOME | End: 2023-12-26
Admitting: NURSE PRACTITIONER
Payer: MEDICARE

## 2023-12-26 DIAGNOSIS — N28.9 LESION OF LEFT NATIVE KIDNEY: ICD-10-CM

## 2023-12-26 PROCEDURE — 76775 US EXAM ABDO BACK WALL LIM: CPT

## 2023-12-28 ENCOUNTER — TELEPHONE (OUTPATIENT)
Dept: INTERNAL MEDICINE | Facility: CLINIC | Age: 78
End: 2023-12-28
Payer: MEDICARE

## 2023-12-28 DIAGNOSIS — N28.89 LEFT RENAL MASS: Primary | ICD-10-CM

## 2023-12-28 NOTE — TELEPHONE ENCOUNTER
PATIENT CALLED AND STATES HER INSURANCE WILL COVER [facility]. SHE WOULD LIKE TO HAVE CT SCAN OF KIDNEY DONE AT [facility].    CALL BACK NUMBER [phone]

## 2024-01-02 ENCOUNTER — TELEPHONE (OUTPATIENT)
Dept: INTERNAL MEDICINE | Facility: CLINIC | Age: 79
End: 2024-01-02
Payer: MEDICARE

## 2024-01-02 ENCOUNTER — TELEMEDICINE (OUTPATIENT)
Dept: INTERNAL MEDICINE | Facility: CLINIC | Age: 79
End: 2024-01-02
Payer: MEDICARE

## 2024-01-02 DIAGNOSIS — U07.1 COVID-19: Primary | ICD-10-CM

## 2024-01-02 RX ORDER — ALBUTEROL SULFATE AND BUDESONIDE 90; 80 UG/1; UG/1
2 AEROSOL, METERED RESPIRATORY (INHALATION) 4 TIMES DAILY PRN
Qty: 10.7 G | Refills: 0 | Status: SHIPPED | OUTPATIENT
Start: 2024-01-02

## 2024-01-02 RX ORDER — ONDANSETRON 4 MG/1
4 TABLET, FILM COATED ORAL EVERY 8 HOURS PRN
Qty: 30 TABLET | Refills: 0 | Status: SHIPPED | OUTPATIENT
Start: 2024-01-02

## 2024-01-02 RX ORDER — ALBUTEROL SULFATE 90 UG/1
2 AEROSOL, METERED RESPIRATORY (INHALATION) EVERY 4 HOURS PRN
Qty: 18 G | Refills: 0 | Status: SHIPPED | OUTPATIENT
Start: 2024-01-02

## 2024-01-02 NOTE — TELEPHONE ENCOUNTER
"    Caller: Luzmaria Pérez \"Ladonna\"    Relationship to patient: Self    Best call back number: [phone]    Date of positive COVID19 test: 12.31.23    Date of possible COVID19 exposure: UNKNOWN    COVID19 symptoms: COUGH, CONGESTION, BODY ACHES & PAINS    Date of initial quarantine: 12.30.23    Additional information or concerns: PATIENT STATES SHE HAS ASTHMA AND DOES NOT WANT THE COUGH TO GET WORSE. PATIENT IS REQUESTING A PRESCRIPTION FOR A STEROID PACK.    What is the patients preferred pharmacy: [facility]       "

## 2024-01-02 NOTE — PROGRESS NOTES
Subjective   Chief Complaint   Patient presents with    Covid-19 Home Monitoring Video Visit   You have chosen to receive care through a telehealth visit.  Do you consent to use a video/audio connection for your medical care today? Yes      History of Present Illness      Pt started with a cough 3 days ago, tested positive for COVID 2 days ago. Had a fever 2 days ago has resolved. Has been using her ventolin which helps. Cough is clear in color. Chest is not tight. No SOA.     Patient Active Problem List   Diagnosis    Mixed anxiety depressive disorder    Hyperlipidemia    Prediabetes    Obesity (BMI 30-39.9)    Nausea    Hypersomnia with sleep apnea    Asthma    GERD (gastroesophageal reflux disease)    Arthritis of left knee    Essential hypertension    Subclinical hypothyroidism    Anemia    Coronary artery calcification seen on CT scan    Ascending aorta dilation       Allergies   Allergen Reactions    Adhesive Tape Hives    Pantoprazole Nausea Only     Other reaction(s): Headache    Penicillins Hives    Nickel Rash     With cheap jewelry    Troleandomycin Hives       Current Outpatient Medications on File Prior to Visit   Medication Sig Dispense Refill    amLODIPine (NORVASC) 5 MG tablet Take 1 tablet by mouth Daily. 90 tablet 1    aspirin 81 MG EC tablet Take 1 tablet by mouth Daily.      Azelastine-Fluticasone 137-50 MCG/ACT suspension       budesonide-formoterol (SYMBICORT) 160-4.5 MCG/ACT inhaler Symbicort 160-4.5 MCG/ACT Inhalation Aerosol; Patient Sig: Symbicort 160-4.5 MCG/ACT Inhalation Aerosol ; 10; 0; 28-Jul-2015; Active      buPROPion XL (WELLBUTRIN XL) 150 MG 24 hr tablet       citalopram (CeleXA) 20 MG tablet       Dupixent 300 MG/2ML solution pen-injector       EPINEPHrine (EPIPEN) 0.3 MG/0.3ML solution auto-injector injection EpiPen 2-Mitch 0.3 MG/0.3ML Injection Solution Auto-injector; Patient Sig: EpiPen 2-Mitch 0.3 MG/0.3ML Injection Solution Auto-injector ; 2; 0; 21-Nov-2014; Active       esomeprazole (NexIUM) 40 MG capsule Take 1 capsule by mouth.      estradiol (ESTRACE) 0.1 MG/GM vaginal cream       Fish Oil-Krill Oil (KRILL OIL PLUS) capsule Take  by mouth.      fluocinonide (LIDEX) 0.05 % external solution       gabapentin (NEURONTIN) 300 MG capsule       montelukast (SINGULAIR) 10 MG tablet Take  by mouth.      Probiotic Product (ALIGN) capsule Take  by mouth.      rosuvastatin (Crestor) 10 MG tablet Take 1 tablet by mouth Daily. 90 tablet 3    [DISCONTINUED] VENTOLIN  (90 BASE) MCG/ACT inhaler        No current facility-administered medications on file prior to visit.       Past Medical History:   Diagnosis Date    Achilles tendinitis of right lower extremity     Esophageal stricture     GERD (gastroesophageal reflux disease)     Hemorrhoids 11/24/2015    Hyperlipidemia     Inflammatory fibroid polyps of stomach        Family History   Problem Relation Age of Onset    Depression Mother     Depression Brother     Hypertension Daughter     Hypertension Son        Social History     Socioeconomic History    Marital status:    Tobacco Use    Smoking status: Former   Vaping Use    Vaping Use: Never used   Substance and Sexual Activity    Alcohol use: No    Drug use: No    Sexual activity: Never       Past Surgical History:   Procedure Laterality Date    BREAST SURGERY      CHOLECYSTECTOMY      COLONOSCOPY      INCISIONAL BREAST BIOPSY      OOPHORECTOMY      REPLACEMENT TOTAL KNEE Left     TONSILLECTOMY AND ADENOIDECTOMY      TOTAL SHOULDER ARTHROPLASTY           The following portions of the patient's history were reviewed and updated as appropriate: problem list, allergies, current medications, past medical history, past family history, past social history, and past surgical history.    ROS    See HPI    Immunization History   Administered Date(s) Administered    COVID-19 (PFIZER) Purple Cap Monovalent 02/04/2021, 02/25/2021, 04/11/2021, 10/23/2021    Covid-19 (Pfizer) Gray Cap  Monovalent 05/16/2022    FLUAD TRI 65YR+ 10/06/2020    Flu Vaccine Quad PF >36MO 09/18/2018    Fluzone High Dose =>65 Years (Vaxcare ONLY) 10/01/2015, 09/18/2018, 10/08/2019    Fluzone High-Dose 65+yrs 08/25/2021, 11/03/2022, 10/05/2023    Hepatitis A 04/26/2018, 11/02/2018    Influenza Quad Vaccine (Inpatient) 09/20/2017    Influenza TIV (IM) 09/20/2017    Influenza, Unspecified 10/15/2015    Pneumococcal Conjugate 13-Valent (PCV13) 09/28/2015    Pneumococcal Polysaccharide (PPSV23) 10/19/2020    Shingrix 10/30/2019, 10/06/2020, 10/23/2020    TD Preservative Free (Tenivac) 07/15/2021    Td, Unspecified 08/15/2006    Tdap 09/06/2016    Zostavax 02/15/2007       Objective   There were no vitals filed for this visit.  There is no height or weight on file to calculate BMI.  Physical Exam  Psychiatric:         Mood and Affect: Mood normal.         Behavior: Behavior normal.         Thought Content: Thought content normal.         Judgment: Judgment normal.           Assessment & Plan   Diagnoses and all orders for this visit:    1. COVID-19 (Primary)  -     Nirmatrelvir & Ritonavir, 300mg/100mg, (PAXLOVID) 20 x 150 MG & 10 x 100MG tablet therapy pack tablet; Take 3 tablets by mouth 2 (Two) Times a Day.  Dispense: 30 tablet; Refill: 0  -     Albuterol-Budesonide (Airsupra) 90-80 MCG/ACT aerosol; Inhale 2 puffs 4 (Four) Times a Day As Needed (coughing).  Dispense: 10.7 g; Refill: 0  -     Ventolin  (90 Base) MCG/ACT inhaler; Inhale 2 puffs Every 4 (Four) Hours As Needed for Wheezing.  Dispense: 18 g; Refill: 0  -     ondansetron (Zofran) 4 MG tablet; Take 1 tablet by mouth Every 8 (Eight) Hours As Needed for Nausea or Vomiting.  Dispense: 30 tablet; Refill: 0    Discussed options of txt, recommended Paxlovid due to her asthma history. Will start today, needs to quarantine 5 days from sx onset and mask for 5 days following. Hold Crestor while on Paxlovid and cut Amlodipine in half while on as well. Will call if she  feels she needs oral steroids.      No follow-ups on file.

## 2024-02-26 ENCOUNTER — TRANSCRIBE ORDERS (OUTPATIENT)
Dept: ADMINISTRATIVE | Facility: HOSPITAL | Age: 79
End: 2024-02-26
Payer: MEDICARE

## 2024-02-26 DIAGNOSIS — R91.8 PULMONARY NODULES: Primary | ICD-10-CM

## 2024-05-23 DIAGNOSIS — I25.10 CORONARY ARTERY CALCIFICATION SEEN ON CT SCAN: ICD-10-CM

## 2024-05-23 DIAGNOSIS — E03.8 SUBCLINICAL HYPOTHYROIDISM: ICD-10-CM

## 2024-05-23 DIAGNOSIS — E78.5 HYPERLIPIDEMIA, UNSPECIFIED HYPERLIPIDEMIA TYPE: Primary | Chronic | ICD-10-CM

## 2024-05-23 DIAGNOSIS — R73.03 PREDIABETES: ICD-10-CM

## 2024-05-23 LAB
ALBUMIN SERPL-MCNC: 4.3 G/DL (ref 3.5–5.2)
ALBUMIN/GLOB SERPL: 2 G/DL
ALP SERPL-CCNC: 76 U/L (ref 39–117)
ALT SERPL-CCNC: 9 U/L (ref 1–33)
AST SERPL-CCNC: 14 U/L (ref 1–32)
BILIRUB SERPL-MCNC: 0.6 MG/DL (ref 0–1.2)
BUN SERPL-MCNC: 9 MG/DL (ref 8–23)
BUN/CREAT SERPL: 9.2 (ref 7–25)
CALCIUM SERPL-MCNC: 9.5 MG/DL (ref 8.6–10.5)
CHLORIDE SERPL-SCNC: 105 MMOL/L (ref 98–107)
CHOLEST SERPL-MCNC: 241 MG/DL (ref 0–200)
CHOLEST/HDLC SERPL: 3.49 {RATIO}
CO2 SERPL-SCNC: 25.7 MMOL/L (ref 22–29)
CREAT SERPL-MCNC: 0.98 MG/DL (ref 0.57–1)
EGFRCR SERPLBLD CKD-EPI 2021: 59.6 ML/MIN/1.73
GLOBULIN SER CALC-MCNC: 2.1 GM/DL
GLUCOSE SERPL-MCNC: 107 MG/DL (ref 65–99)
HBA1C MFR BLD: 5.4 % (ref 4.8–5.6)
HDLC SERPL-MCNC: 69 MG/DL (ref 40–60)
LDLC SERPL CALC-MCNC: 145 MG/DL (ref 0–100)
POTASSIUM SERPL-SCNC: 3.9 MMOL/L (ref 3.5–5.2)
PROT SERPL-MCNC: 6.4 G/DL (ref 6–8.5)
SODIUM SERPL-SCNC: 144 MMOL/L (ref 136–145)
TRIGL SERPL-MCNC: 151 MG/DL (ref 0–150)
TSH SERPL DL<=0.005 MIU/L-ACNC: 4.82 UIU/ML (ref 0.27–4.2)
VLDLC SERPL CALC-MCNC: 27 MG/DL (ref 5–40)

## 2024-05-25 PROBLEM — R91.1 PULMONARY NODULE: Status: ACTIVE | Noted: 2024-05-25

## 2024-05-25 LAB
T4 FREE SERPL-MCNC: 1.12 NG/DL (ref 0.93–1.7)
WRITTEN AUTHORIZATION: NORMAL

## 2024-05-25 RX ORDER — FLUTICASONE PROPIONATE 50 MCG
SPRAY, SUSPENSION (ML) NASAL
COMMUNITY
Start: 2024-03-05

## 2024-05-25 RX ORDER — GABAPENTIN 100 MG/1
100 CAPSULE ORAL
COMMUNITY
Start: 2024-02-19

## 2024-05-25 NOTE — PROGRESS NOTES
Subjective   Chief Complaint   Patient presents with    Hypertension     6mo fup        History of Present Illness   78 y.o. female presents for follow up regarding HTN, subclinical hypothyroidism, prediabetes, HLD and obesity.     Denies chest pain; dyspnea is unchanged. She is down 12# since December. She is watching her CHO intake and fats. She continues to take Crestor 10 mg daily. She is starting to walk; she is considering water aerobics or swimming. Continues surveillance of pulmonary nodule with Dr. Bobo.     Upcoming pelvic exam with GYN with MMG and DXA.      Patient Active Problem List   Diagnosis    Mixed anxiety depressive disorder    Hyperlipidemia    Prediabetes    Obesity (BMI 30-39.9)    Nausea    Hypersomnia with sleep apnea    Asthma    GERD (gastroesophageal reflux disease)    Arthritis of left knee    Essential hypertension    Subclinical hypothyroidism    Anemia    Coronary artery calcification seen on CT scan    Ascending aorta dilation    Pulmonary nodule       Allergies   Allergen Reactions    Adhesive Tape Hives    Pantoprazole Nausea Only     Other reaction(s): Headache    Penicillins Hives    Nickel Rash     With cheap jewelry    Troleandomycin Hives       Current Outpatient Medications on File Prior to Visit   Medication Sig Dispense Refill    Albuterol-Budesonide (Airsupra) 90-80 MCG/ACT aerosol Inhale 2 puffs 4 (Four) Times a Day As Needed (coughing). 10.7 g 0    aspirin 81 MG EC tablet Take 1 tablet by mouth Daily.      Azelastine-Fluticasone 137-50 MCG/ACT suspension       budesonide-formoterol (SYMBICORT) 160-4.5 MCG/ACT inhaler Symbicort 160-4.5 MCG/ACT Inhalation Aerosol; Patient Sig: Symbicort 160-4.5 MCG/ACT Inhalation Aerosol ; 10; 0; 28-Jul-2015; Active      buPROPion XL (WELLBUTRIN XL) 150 MG 24 hr tablet       citalopram (CeleXA) 20 MG tablet       Dupixent 300 MG/2ML solution pen-injector       EPINEPHrine (EPIPEN) 0.3 MG/0.3ML solution auto-injector injection EpiPen  2-Mitch 0.3 MG/0.3ML Injection Solution Auto-injector; Patient Sig: EpiPen 2-Mitch 0.3 MG/0.3ML Injection Solution Auto-injector ; 2; 0; 2014; Active      esomeprazole (nexIUM) 20 MG capsule       estradiol (ESTRACE) 0.1 MG/GM vaginal cream       Fish Oil-Krill Oil (KRILL OIL PLUS) capsule Take  by mouth.      fluocinonide (LIDEX) 0.05 % external solution       fluticasone (FLONASE) 50 MCG/ACT nasal spray       gabapentin (NEURONTIN) 100 MG capsule Take 1 capsule by mouth.      montelukast (SINGULAIR) 10 MG tablet Take  by mouth.      ondansetron (Zofran) 4 MG tablet Take 1 tablet by mouth Every 8 (Eight) Hours As Needed for Nausea or Vomiting. 30 tablet 0    Probiotic Product (ALIGN) capsule Take  by mouth.      Ventolin  (90 Base) MCG/ACT inhaler Inhale 2 puffs Every 4 (Four) Hours As Needed for Wheezing. 18 g 0    [DISCONTINUED] amLODIPine (NORVASC) 5 MG tablet Take 1 tablet by mouth Daily. 90 tablet 1    [DISCONTINUED] rosuvastatin (Crestor) 10 MG tablet Take 1 tablet by mouth Daily. 90 tablet 3     No current facility-administered medications on file prior to visit.       Past Medical History:   Diagnosis Date    Achilles tendinitis of right lower extremity     Esophageal stricture     GERD (gastroesophageal reflux disease)     Hemorrhoids 2015    Hyperlipidemia     Inflammatory fibroid polyps of stomach        Family History   Problem Relation Age of Onset    Depression Mother     Depression Brother     Hypertension Daughter     Hypertension Son        Social History     Socioeconomic History    Marital status:    Tobacco Use    Smoking status: Former     Current packs/day: 0.00     Types: Cigarettes     Quit date:      Years since quittin.4     Passive exposure: Never    Smokeless tobacco: Never   Vaping Use    Vaping status: Never Used   Substance and Sexual Activity    Alcohol use: Not Currently    Drug use: Never    Sexual activity: Defer       Past Surgical History:  "  Procedure Laterality Date    BREAST SURGERY      CHOLECYSTECTOMY      COLONOSCOPY      INCISIONAL BREAST BIOPSY      OOPHORECTOMY      REPLACEMENT TOTAL KNEE Left     TONSILLECTOMY AND ADENOIDECTOMY      TOTAL SHOULDER ARTHROPLASTY         The following portions of the patient's history were reviewed and updated as appropriate: problem list, allergies, current medications, past medical history, and past social history.    Review of Systems    Immunization History   Administered Date(s) Administered    COVID-19 (PFIZER) Purple Cap Monovalent 02/04/2021, 02/25/2021, 04/11/2021, 10/23/2021    COVID-19 F23 (PFIZER) 12YRS+ (COMIRNATY) 02/22/2024    Covid-19 (Pfizer) Gray Cap Monovalent 05/16/2022    FLUAD TRI 65YR+ 10/06/2020    Flu Vaccine Quad PF >36MO 09/18/2018    Fluzone High Dose =>65 Years (Vaxcare ONLY) 10/01/2015, 09/18/2018, 10/08/2019    Fluzone High-Dose 65+yrs 08/25/2021, 11/03/2022, 10/05/2023    Hepatitis A 04/26/2018, 11/02/2018    Influenza Quad Vaccine (Inpatient) 09/20/2017    Influenza TIV (IM) 09/20/2017    Influenza, Unspecified 10/15/2015    Pneumococcal Conjugate 13-Valent (PCV13) 09/28/2015    Pneumococcal Polysaccharide (PPSV23) 10/19/2020    Shingrix 10/30/2019, 10/06/2020, 10/23/2020    TD Preservative Free (Tenivac) 07/15/2021    Td, Unspecified 08/15/2006    Tdap 09/06/2016    Zostavax 02/15/2007       Objective   Vitals:    05/30/24 0820   BP: 112/78   Pulse: 92   Resp: 16   Temp: 97.6 °F (36.4 °C)   Weight: 104 kg (230 lb)   Height: 168.9 cm (66.5\")     Body mass index is 36.57 kg/m².  Physical Exam  Vitals reviewed.   Constitutional:       Appearance: Normal appearance. She is well-developed. She is obese.   HENT:      Head: Normocephalic and atraumatic.   Cardiovascular:      Rate and Rhythm: Normal rate and regular rhythm.      Heart sounds: Normal heart sounds, S1 normal and S2 normal.   Pulmonary:      Effort: Pulmonary effort is normal.      Breath sounds: Normal breath sounds. "   Musculoskeletal:      Comments: Ambulates without assistance; no clubbing, cyanosis or edema.    Skin:     General: Skin is warm and dry.   Neurological:      Mental Status: She is alert.   Psychiatric:         Mood and Affect: Mood normal.         Behavior: Behavior normal.         Procedures    Assessment & Plan   Diagnoses and all orders for this visit:    1. Essential hypertension (Primary)  Comments:  Controlled. Continue current medications and RTO in 6M.  Orders:  -     amLODIPine (NORVASC) 5 MG tablet; Take 1 tablet by mouth Daily.  Dispense: 90 tablet; Refill: 1    2. Subclinical hypothyroidism  Comments:  TSH 4.82 with normal FT4. Repeat TSH and FT4 in 6M.  Orders:  -     TSH; Future  -     T4, free; Future    3. Prediabetes  Comments:  Fasting glucose elevated at 107 but A1C looks good at 5.4%. Again weight loss encouraged.    4. Hyperlipidemia, unspecified hyperlipidemia type  Comments:  LDL up to 145 from 87 last year. Increase Crestor to 20 mg daily with FLP & HFP in .  Orders:  -     Lipid Panel; Future  -     Discontinue: rosuvastatin (Crestor) 20 MG tablet; Take 1 tablet by mouth Daily.  Dispense: 90 tablet; Refill: 1  -     rosuvastatin (Crestor) 20 MG tablet; Take 1 tablet by mouth Daily.  Dispense: 90 tablet; Refill: 1  -     Hepatic Function Panel; Future    5. Obesity (BMI 30-39.9)  Comments:  Weight  down 12#. Weight loss via dietary changes and 150 minutes weekly aerobic exercise advised.    6. Colon cancer screening  Comments:  Schedule with Dr. Hernández.  Orders:  -     Ambulatory Referral For Screening Colonoscopy    Records reviewed include previous OV with myself and Dr. Bobo as well as labs, PFTs and imaging.      Return in about 6 months (around 11/30/2024) for Medicare Wellness, Lab B4 FUP & lab in .

## 2024-05-25 NOTE — PATIENT INSTRUCTIONS
An order has been placed for your screening colonoscopy. Please be sure to schedule this.      RSV vaccination advised at local pharmacy.

## 2024-05-30 ENCOUNTER — OFFICE VISIT (OUTPATIENT)
Dept: INTERNAL MEDICINE | Facility: CLINIC | Age: 79
End: 2024-05-30
Payer: MEDICARE

## 2024-05-30 VITALS
WEIGHT: 230 LBS | HEIGHT: 66 IN | RESPIRATION RATE: 16 BRPM | HEART RATE: 92 BPM | TEMPERATURE: 97.6 F | SYSTOLIC BLOOD PRESSURE: 112 MMHG | DIASTOLIC BLOOD PRESSURE: 78 MMHG | BODY MASS INDEX: 36.96 KG/M2

## 2024-05-30 DIAGNOSIS — E66.9 OBESITY (BMI 30-39.9): Chronic | ICD-10-CM

## 2024-05-30 DIAGNOSIS — E78.5 HYPERLIPIDEMIA, UNSPECIFIED HYPERLIPIDEMIA TYPE: Chronic | ICD-10-CM

## 2024-05-30 DIAGNOSIS — R73.03 PREDIABETES: Chronic | ICD-10-CM

## 2024-05-30 DIAGNOSIS — I10 ESSENTIAL HYPERTENSION: Primary | Chronic | ICD-10-CM

## 2024-05-30 DIAGNOSIS — E03.8 SUBCLINICAL HYPOTHYROIDISM: Chronic | ICD-10-CM

## 2024-05-30 DIAGNOSIS — Z12.11 COLON CANCER SCREENING: ICD-10-CM

## 2024-05-30 PROCEDURE — 1159F MED LIST DOCD IN RCRD: CPT | Performed by: NURSE PRACTITIONER

## 2024-05-30 PROCEDURE — 99214 OFFICE O/P EST MOD 30 MIN: CPT | Performed by: NURSE PRACTITIONER

## 2024-05-30 PROCEDURE — 3078F DIAST BP <80 MM HG: CPT | Performed by: NURSE PRACTITIONER

## 2024-05-30 PROCEDURE — 1125F AMNT PAIN NOTED PAIN PRSNT: CPT | Performed by: NURSE PRACTITIONER

## 2024-05-30 PROCEDURE — 1160F RVW MEDS BY RX/DR IN RCRD: CPT | Performed by: NURSE PRACTITIONER

## 2024-05-30 PROCEDURE — 3074F SYST BP LT 130 MM HG: CPT | Performed by: NURSE PRACTITIONER

## 2024-05-30 RX ORDER — AMLODIPINE BESYLATE 5 MG/1
5 TABLET ORAL DAILY
Qty: 90 TABLET | Refills: 1 | Status: SHIPPED | OUTPATIENT
Start: 2024-05-30

## 2024-05-30 RX ORDER — ROSUVASTATIN CALCIUM 20 MG/1
20 TABLET, COATED ORAL DAILY
Qty: 90 TABLET | Refills: 1 | Status: SHIPPED | OUTPATIENT
Start: 2024-05-30 | End: 2024-05-30

## 2024-05-30 RX ORDER — ROSUVASTATIN CALCIUM 20 MG/1
20 TABLET, COATED ORAL DAILY
Qty: 90 TABLET | Refills: 1 | Status: SHIPPED | OUTPATIENT
Start: 2024-05-30

## 2024-06-20 NOTE — PROGRESS NOTES
Subjective   Chief Complaint   Patient presents with    Rash     Onset Monday//back of neck onto face//torso itching as well        History of Present Illness   78 y.o. female presents with cc rash on her neck present times x4days. She can't see the rash as it is mostly on the back of her neck but she can feel it. Notes her skin is dry. It is very itchy. No new skin products, personal care products or laundry products. No new medications or supplements. She cannot get in with her dermatologist until August.      Patient Active Problem List   Diagnosis    Mixed anxiety depressive disorder    Hyperlipidemia    Prediabetes    Obesity (BMI 30-39.9)    Nausea    Hypersomnia with sleep apnea    Asthma    GERD (gastroesophageal reflux disease)    Arthritis of left knee    Essential hypertension    Subclinical hypothyroidism    Anemia    Coronary artery calcification seen on CT scan    Ascending aorta dilation    Pulmonary nodule       Allergies   Allergen Reactions    Adhesive Tape Hives    Pantoprazole Nausea Only     Other reaction(s): Headache    Penicillins Hives    Nickel Rash     With cheap jewelry    Troleandomycin Hives       Current Outpatient Medications on File Prior to Visit   Medication Sig Dispense Refill    Albuterol-Budesonide (Airsupra) 90-80 MCG/ACT aerosol Inhale 2 puffs 4 (Four) Times a Day As Needed (coughing). 10.7 g 0    amLODIPine (NORVASC) 5 MG tablet Take 1 tablet by mouth Daily. 90 tablet 1    aspirin 81 MG EC tablet Take 1 tablet by mouth Daily.      Azelastine-Fluticasone 137-50 MCG/ACT suspension       budesonide-formoterol (SYMBICORT) 160-4.5 MCG/ACT inhaler Symbicort 160-4.5 MCG/ACT Inhalation Aerosol; Patient Sig: Symbicort 160-4.5 MCG/ACT Inhalation Aerosol ; 10; 0; 28-Jul-2015; Active      buPROPion XL (WELLBUTRIN XL) 150 MG 24 hr tablet       citalopram (CeleXA) 20 MG tablet       Dupixent 300 MG/2ML solution pen-injector       EPINEPHrine (EPIPEN) 0.3 MG/0.3ML solution auto-injector  injection EpiPen 2-Mitch 0.3 MG/0.3ML Injection Solution Auto-injector; Patient Sig: EpiPen 2-Mitch 0.3 MG/0.3ML Injection Solution Auto-injector ; 2; 0; 2014; Active      esomeprazole (nexIUM) 20 MG capsule       estradiol (ESTRACE) 0.1 MG/GM vaginal cream       Fish Oil-Krill Oil (KRILL OIL PLUS) capsule Take  by mouth.      fluticasone (FLONASE) 50 MCG/ACT nasal spray       gabapentin (NEURONTIN) 100 MG capsule Take 1 capsule by mouth.      montelukast (SINGULAIR) 10 MG tablet Take  by mouth.      Probiotic Product (ALIGN) capsule Take  by mouth.      rosuvastatin (Crestor) 20 MG tablet Take 1 tablet by mouth Daily. 90 tablet 1    Ventolin  (90 Base) MCG/ACT inhaler Inhale 2 puffs Every 4 (Four) Hours As Needed for Wheezing. 18 g 0    fluocinonide (LIDEX) 0.05 % external solution  (Patient not taking: Reported on 2024)      ondansetron (Zofran) 4 MG tablet Take 1 tablet by mouth Every 8 (Eight) Hours As Needed for Nausea or Vomiting. (Patient not taking: Reported on 2024) 30 tablet 0     No current facility-administered medications on file prior to visit.       Past Medical History:   Diagnosis Date    Achilles tendinitis of right lower extremity     Esophageal stricture     GERD (gastroesophageal reflux disease)     Hemorrhoids 2015    Hyperlipidemia     Inflammatory fibroid polyps of stomach        Family History   Problem Relation Age of Onset    Depression Mother     Depression Brother     Hypertension Daughter     Hypertension Son        Social History     Socioeconomic History    Marital status:    Tobacco Use    Smoking status: Former     Current packs/day: 0.00     Types: Cigarettes     Quit date:      Years since quittin.5     Passive exposure: Never    Smokeless tobacco: Never   Vaping Use    Vaping status: Never Used   Substance and Sexual Activity    Alcohol use: Not Currently    Drug use: Never    Sexual activity: Defer       Past Surgical History:  "  Procedure Laterality Date    BREAST SURGERY      CHOLECYSTECTOMY      COLONOSCOPY      INCISIONAL BREAST BIOPSY      OOPHORECTOMY      REPLACEMENT TOTAL KNEE Left     TONSILLECTOMY AND ADENOIDECTOMY      TOTAL SHOULDER ARTHROPLASTY         The following portions of the patient's history were reviewed and updated as appropriate: problem list, allergies, current medications, past medical history, and past social history.    Review of Systems    Immunization History   Administered Date(s) Administered    Arexvy (RSV, Adults 60+ yrs) 05/30/2024    COVID-19 (PFIZER) Purple Cap Monovalent 02/04/2021, 02/25/2021, 04/11/2021, 10/23/2021    COVID-19 F23 (PFIZER) 12YRS+ (COMIRNATY) 02/22/2024    Covid-19 (Pfizer) Gray Cap Monovalent 05/16/2022    FLUAD TRI 65YR+ 10/06/2020    Flu Vaccine Quad PF >36MO 09/18/2018    Fluzone High Dose =>65 Years ([facility] ONLY) 10/01/2015, 09/18/2018, 10/08/2019    Fluzone High-Dose 65+yrs 08/25/2021, 11/03/2022, 10/05/2023    Hepatitis A 04/26/2018, 11/02/2018    Influenza Quad Vaccine (Inpatient) 09/20/2017    Influenza TIV (IM) 09/20/2017    Influenza, Unspecified 10/15/2015    Pneumococcal Conjugate 13-Valent (PCV13) 09/28/2015    Pneumococcal Polysaccharide (PPSV23) 10/19/2020    Shingrix 10/30/2019, 10/06/2020, 10/23/2020    TD Preservative Free (Tenivac) 07/15/2021    Td, Unspecified 08/15/2006    Tdap 09/06/2016    Zostavax 02/15/2007       Objective   Vitals:    06/21/24 0809   Temp: 98.2 °F (36.8 °C)   Weight: 104 kg (230 lb)   Height: 168.9 cm (66.5\")     Body mass index is 36.57 kg/m².  Physical Exam  Constitutional:       Appearance: Normal appearance. She is obese.   HENT:      Head: Normocephalic and atraumatic.   Pulmonary:      Effort: Pulmonary effort is normal.   Skin:     General: Skin is warm and dry.      Comments: Scattered flesh colored papules noted on back of neck; underlying skin is dry.   Neurological:      Mental Status: She is alert.   Psychiatric:         Mood " and Affect: Mood normal.         Behavior: Behavior normal.         Procedures    Assessment & Plan   Diagnoses and all orders for this visit:    1. Rash of neck (Primary)  Comments:  OK to use cortisone cream on back of neck but perhaps more imnportantly a fragrance free moisturizing cream such as Aveeno or Cerve.    Records reviewed include previous OV with myself as well as labs.     Return for Next scheduled follow up.

## 2024-06-21 ENCOUNTER — OFFICE VISIT (OUTPATIENT)
Dept: INTERNAL MEDICINE | Facility: CLINIC | Age: 79
End: 2024-06-21
Payer: MEDICARE

## 2024-06-21 VITALS — TEMPERATURE: 98.2 F | WEIGHT: 230 LBS | BODY MASS INDEX: 36.96 KG/M2 | HEIGHT: 66 IN

## 2024-06-21 DIAGNOSIS — R21 RASH OF NECK: Primary | ICD-10-CM

## 2024-09-11 DIAGNOSIS — E78.5 HYPERLIPIDEMIA, UNSPECIFIED HYPERLIPIDEMIA TYPE: Chronic | ICD-10-CM

## 2024-09-11 RX ORDER — ROSUVASTATIN CALCIUM 20 MG/1
20 TABLET, COATED ORAL DAILY
Qty: 90 TABLET | Refills: 3 | Status: SHIPPED | OUTPATIENT
Start: 2024-09-11

## 2024-10-17 DIAGNOSIS — I10 ESSENTIAL HYPERTENSION: Chronic | ICD-10-CM

## 2024-10-17 RX ORDER — AMLODIPINE BESYLATE 5 MG/1
5 TABLET ORAL DAILY
Qty: 90 TABLET | Refills: 0 | Status: SHIPPED | OUTPATIENT
Start: 2024-10-17

## 2024-11-12 DIAGNOSIS — M54.50 CHRONIC MIDLINE LOW BACK PAIN WITHOUT SCIATICA: ICD-10-CM

## 2024-11-12 DIAGNOSIS — G89.29 CHRONIC MIDLINE LOW BACK PAIN WITHOUT SCIATICA: ICD-10-CM

## 2024-11-13 RX ORDER — CELECOXIB 200 MG/1
200 CAPSULE ORAL DAILY
Qty: 90 CAPSULE | Refills: 3 | OUTPATIENT
Start: 2024-11-13

## 2024-11-21 DIAGNOSIS — E03.8 SUBCLINICAL HYPOTHYROIDISM: Chronic | ICD-10-CM

## 2024-11-27 LAB
ALBUMIN SERPL-MCNC: 4.1 G/DL (ref 3.5–5.2)
ALP SERPL-CCNC: 73 U/L (ref 39–117)
ALT SERPL-CCNC: 18 U/L (ref 1–33)
AST SERPL-CCNC: 23 U/L (ref 1–32)
BILIRUB DIRECT SERPL-MCNC: 0.3 MG/DL (ref 0–0.3)
BILIRUB SERPL-MCNC: 1 MG/DL (ref 0–1.2)
CHOLEST SERPL-MCNC: 162 MG/DL (ref 0–200)
HDLC SERPL-MCNC: 82 MG/DL (ref 40–60)
LDLC SERPL CALC-MCNC: 61 MG/DL (ref 0–100)
PROT SERPL-MCNC: 6.7 G/DL (ref 6–8.5)
T4 FREE SERPL-MCNC: 1.15 NG/DL (ref 0.92–1.68)
TRIGL SERPL-MCNC: 106 MG/DL (ref 0–150)
TSH SERPL DL<=0.005 MIU/L-ACNC: 6.52 UIU/ML (ref 0.27–4.2)
VLDLC SERPL CALC-MCNC: 19 MG/DL (ref 5–40)

## 2024-12-01 PROBLEM — D64.9 ANEMIA: Status: RESOLVED | Noted: 2023-04-01 | Resolved: 2024-12-01

## 2024-12-01 NOTE — PROGRESS NOTES
Subjective   The ABCs of the Annual Wellness Visit  Medicare Wellness Visit      Luzmaria Pérez is a 79 y.o. patient who presents for a Medicare Wellness Visit.    The following portions of the patient's history were reviewed and   updated as appropriate: allergies, current medications, past family history, past medical history, past social history, past surgical history, and problem list.    Compared to one year ago, the patient's physical   health is the same.  Compared to one year ago, the patient's mental   health is the same.    Recent Hospitalizations:  She was not admitted to the hospital during the last year.     Current Medical Providers:  Patient Care Team:  Keiry Means APRN as PCP - General (Family Medicine)    Outpatient Medications Prior to Visit   Medication Sig Dispense Refill    amLODIPine (NORVASC) 5 MG tablet TAKE 1 TABLET DAILY 90 tablet 0    aspirin 81 MG EC tablet Take 1 tablet by mouth Daily.      Azelastine-Fluticasone 137-50 MCG/ACT suspension       budesonide-formoterol (SYMBICORT) 160-4.5 MCG/ACT inhaler Symbicort 160-4.5 MCG/ACT Inhalation Aerosol; Patient Sig: Symbicort 160-4.5 MCG/ACT Inhalation Aerosol ; 10; 0; 28-Jul-2015; Active      buPROPion XL (WELLBUTRIN XL) 150 MG 24 hr tablet       citalopram (CeleXA) 20 MG tablet       clobetasol (TEMOVATE) 0.05 % ointment APPLY THIN LAYER (0.5 GRAM) TO AREA OF IRRITATION TWICE DAILY X TWO WEEKS THEN ONCE DAILY AT BEDTIME      Dupixent 300 MG/2ML solution pen-injector       EPINEPHrine (EPIPEN) 0.3 MG/0.3ML solution auto-injector injection EpiPen 2-Mitch 0.3 MG/0.3ML Injection Solution Auto-injector; Patient Sig: EpiPen 2-Mitch 0.3 MG/0.3ML Injection Solution Auto-injector ; 2; 0; 21-Nov-2014; Active      esomeprazole (nexIUM) 20 MG capsule       estradiol (ESTRACE) 0.1 MG/GM vaginal cream       Fish Oil-Krill Oil (KRILL OIL PLUS) capsule Take  by mouth.      fluocinonide (LIDEX) 0.05 % external solution       fluticasone (FLONASE) 50  "MCG/ACT nasal spray       gabapentin (NEURONTIN) 100 MG capsule Take 1 capsule by mouth.      montelukast (SINGULAIR) 10 MG tablet Take  by mouth.      rosuvastatin (Crestor) 20 MG tablet Take 1 tablet by mouth Daily. 90 tablet 3    Ventolin  (90 Base) MCG/ACT inhaler Inhale 2 puffs Every 4 (Four) Hours As Needed for Wheezing. 18 g 0    Albuterol-Budesonide (Airsupra) 90-80 MCG/ACT aerosol Inhale 2 puffs 4 (Four) Times a Day As Needed (coughing). 10.7 g 0    ondansetron (Zofran) 4 MG tablet Take 1 tablet by mouth Every 8 (Eight) Hours As Needed for Nausea or Vomiting. 30 tablet 0    Probiotic Product (ALIGN) capsule Take  by mouth.       No facility-administered medications prior to visit.     No opioid medication identified on active medication list. I have reviewed chart for other potential  high risk medication/s and harmful drug interactions in the elderly.      Aspirin is on active medication list. Aspirin use is indicated based on review of current medical condition/s. Pros and cons of this therapy have been discussed today. Benefits of this medication outweigh potential harm.  Patient has been encouraged to continue taking this medication.  .      Patient Active Problem List   Diagnosis    Mixed anxiety depressive disorder    Hyperlipidemia    Prediabetes    Obesity (BMI 30-39.9)    Nausea    Hypersomnia with sleep apnea    Asthma    GERD (gastroesophageal reflux disease)    Arthritis of left knee    Essential hypertension    Subclinical hypothyroidism    Coronary artery calcification seen on CT scan    Ascending aorta dilation    Pulmonary nodule     Advance Care Planning Advance Directive is not on file.  ACP discussion was held with the patient during this visit. Patient does not have an advance directive, declines further assistance.      Objective   Vitals:    12/03/24 0749   BP: 130/84   Pulse: 88   Resp: 16   Temp: 98.2 °F (36.8 °C)   Weight: 113 kg (249 lb)   Height: 168.9 cm (66.5\")   PainSc: " "0-No pain       Estimated body mass index is 39.59 kg/m² as calculated from the following:    Height as of this encounter: 168.9 cm (66.5\").    Weight as of this encounter: 113 kg (249 lb).    Class 2 Severe Obesity (BMI >=35 and <=39.9). Obesity-related health conditions include the following: obstructive sleep apnea, hypertension, dyslipidemias, GERD, and osteoarthritis. Obesity is unchanged. BMI is is above average; BMI management plan is completed. We discussed portion control and increasing exercise.       Does the patient have evidence of cognitive impairment? No  Lab Results   Component Value Date    CHLPL 162 2024    TRIG 106 2024    HDL 82 (H) 2024    LDL 61 2024    VLDL 19 2024                                                                                                Health  Risk Assessment    Smoking Status:  Social History     Tobacco Use   Smoking Status Former    Current packs/day: 0.00    Types: Cigarettes    Quit date:     Years since quittin.9    Passive exposure: Never   Smokeless Tobacco Never     Alcohol Consumption:  Social History     Substance and Sexual Activity   Alcohol Use Not Currently       Fall Risk Screen  STEADI Fall Risk Assessment was completed, and patient is at LOW risk for falls.Assessment completed on:12/3/2024    Depression Screening   Little interest or pleasure in doing things? Not at all   Feeling down, depressed, or hopeless? Not at all   PHQ-2 Total Score 0      Health Habits and Functional and Cognitive Screenin/26/2024    11:04 AM   Functional & Cognitive Status   Do you have difficulty preparing food and eating? No    Do you have difficulty bathing yourself, getting dressed or grooming yourself? No    Do you have difficulty using the toilet? No    Do you have difficulty moving around from place to place? No    Do you have trouble with steps or getting out of a bed or a chair? No    Current Diet Unhealthy Diet  "   Dental Exam Up to date    Eye Exam Not up to date    Exercise (times per week) 0 times per week    Current Exercises Include No Regular Exercise    Do you need help using the phone?  No    Are you deaf or do you have serious difficulty hearing?  No    Do you need help to go to places out of walking distance? No    Do you need help shopping? No    Do you need help preparing meals?  No    Do you need help with housework?  No    Do you need help with laundry? No    Do you need help taking your medications? No    Do you need help managing money? No    Do you ever drive or ride in a car without wearing a seat belt? No    Have you felt unusual stress, anger or loneliness in the last month? No    Who do you live with? Alone    If you need help, do you have trouble finding someone available to you? No    Have you been bothered in the last four weeks by sexual problems? No    Do you have difficulty concentrating, remembering or making decisions? No        Patient-reported           Age-appropriate Screening Schedule:  Refer to the list below for future screening recommendations based on patient's age, sex and/or medical conditions. Orders for these recommended tests are listed in the plan section. The patient has been provided with a written plan.    Health Maintenance List  Health Maintenance   Topic Date Due    COLORECTAL CANCER SCREENING  01/17/2022    DXA SCAN  12/31/2024 (Originally 9/16/2021)    LIPID PANEL  11/27/2025    ANNUAL WELLNESS VISIT  12/03/2025    BMI FOLLOWUP  12/03/2025    TDAP/TD VACCINES (3 - Td or Tdap) 07/15/2031    HEPATITIS C SCREENING  Completed    COVID-19 Vaccine  Completed    RSV Vaccine - Adults  Completed    INFLUENZA VACCINE  Completed    Pneumococcal Vaccine 65+  Completed    ZOSTER VACCINE  Completed    MAMMOGRAM  Discontinued                                                                                                                                                CMS Preventative  "Services Quick Reference  Risk Factors Identified During Encounter  CLS discussed.     The above risks/problems have been discussed with the patient.  Pertinent information has been shared with the patient in the After Visit Summary.  An After Visit Summary and PPPS were made available to the patient.    Follow Up:   Next Medicare Wellness visit to be scheduled in 1 year.         Additional E&M Note during same encounter follows:  Patient has additional, significant, and separately identifiable condition(s)/problem(s) that require work above and beyond the Medicare Wellness Visit     Chief Complaint  Medicare Wellness-subsequent, Back Pain, Hypertension, Fatigue, and Weighgt gain    Subjective   HPI  Ladonna is also being seen today for HTN, HLD, prediabetes, subclinical hypothyroidism, and obesity. She has gained 20# over the last month. Her lower back is causing her pain; previously treated with muscle relaxers. Imaging showed degenerative changes earlier this year. No radiating pain in her legs, denies numbness or tingling in her legs; no weakness, B/B incontinence or saddle anesthesia. Denies chest pain or dyspnea. Uses Albuterol once daily; continues with [facility]. She is back to  for accountability. Fatigue has been worse lately and she has just felt lazy. She is waiting until the first of the year for CLS with Dr. Hernández.        Objective   Vital Signs:  /84   Pulse 88   Temp 98.2 °F (36.8 °C)   Resp 16   Ht 168.9 cm (66.5\")   Wt 113 kg (249 lb)   BMI 39.59 kg/m²   Physical Exam  Vitals reviewed.   Constitutional:       Appearance: Normal appearance. She is well-developed. She is obese.   HENT:      Head: Normocephalic and atraumatic.      Right Ear: Tympanic membrane, ear canal and external ear normal.      Left Ear: Tympanic membrane, ear canal and external ear normal.      Nose: Nose normal.      Mouth/Throat:      Mouth: Mucous membranes are moist.      Pharynx: Oropharynx is " clear. No oropharyngeal exudate or posterior oropharyngeal erythema.   Eyes:      General: No scleral icterus.     Extraocular Movements: Extraocular movements intact.      Conjunctiva/sclera: Conjunctivae normal.      Pupils: Pupils are equal, round, and reactive to light.   Neck:      Thyroid: No thyromegaly.      Vascular: No carotid bruit.   Cardiovascular:      Rate and Rhythm: Normal rate and regular rhythm.      Heart sounds: Normal heart sounds. No murmur heard.  Pulmonary:      Effort: Pulmonary effort is normal.      Breath sounds: Normal breath sounds.   Abdominal:      General: Bowel sounds are normal. There is no distension.      Palpations: Abdomen is soft.      Tenderness: There is no abdominal tenderness.   Musculoskeletal:      Cervical back: Neck supple.      Comments: Ambulates without assistance; no clubbing, cyanosis or edema.    Lymphadenopathy:      Cervical: No cervical adenopathy.   Skin:     General: Skin is warm and dry.   Neurological:      Mental Status: She is alert.   Psychiatric:         Mood and Affect: Mood normal.         Behavior: Behavior normal.                 Assessment and Plan      Medicare annual wellness visit, subsequent    Chronic midline low back pain without sciatica    Essential hypertension    Hyperlipidemia, unspecified hyperlipidemia type     Subclinical hypothyroidism    Obesity (BMI 30-39.9)  Patient's (Body mass index is 39.59 kg/m².) indicates that they are obese (BMI >30) with health conditions that include obstructive sleep apnea, hypertension, dyslipidemias, and osteoarthritis . Weight is worsening. BMI  is above average; BMI management plan is completed. We discussed portion control, increasing exercise, and Weight Watchers or other Commercial based weight reduction program.   Prediabetes    History of anemia    Colon cancer screening    Diagnoses and all orders for this visit:    1. Medicare annual wellness visit, subsequent (Primary)  Comments:  150  minutes weekly aerobic exercise advised. Schedule CLS with Dr. Hernández.    2. Chronic midline low back pain without sciatica  Comments:  L3-L4 central canal stenosis noted on imaging earlier this year. She ha done well with short course of Celebrex in the past. PT advised. Weight loss encouraged.  Orders:  -     celecoxib (CeleBREX) 200 MG capsule; Take 1 capsule by mouth Daily.  Dispense: 30 capsule; Refill: 1  -     Ambulatory Referral to Physical Therapy for Evaluation & Treatment    3. Essential hypertension  Comments:  Not at goal. She has gained 20# over the last month. Stressed the importnace of losing weight. Restart exercise. Going to [facility] now. Continue current medications & RTO 6W. Increase Amlodipine to 10 mg daily if still elevated.     4. Hyperlipidemia, unspecified hyperlipidemia type  Comments:  Much improved with Crestor 20 mg daily with LDL 61. Continue current medications and repeat FLP in 1Y.    5. Subclinical hypothyroidism  Comments:  Given weight gain, profound fatigue, TSH elevated at 6.52 with normal FT4, start Levothyroxine 25 mcg qd.  Repeat TSH and FT4 in 6W.  Orders:  -     TSH; Future  -     T4, free; Future  -     levothyroxine (SYNTHROID, LEVOTHROID) 25 MCG tablet; Take 1 tablet by mouth Every Morning.  Dispense: 90 tablet; Refill: 0    6. Obesity (BMI 30-39.9)  Comments:  Gained 20#. Weight loss via dietary changes and 150 minutes weekly aerobic exercise daily (water aerobics encouraged). Restarted WW.    7. Prediabetes  Orders:  -     Comprehensive Metabolic Panel; Future  -     Hemoglobin A1c; Future    8. History of anemia  -     CBC & Differential; Future    9. Colon cancer screening  Comments:  Schedule CLS with Dr. Hernández.  Orders:  -     Ambulatory Referral For Screening Colonoscopy     Records reviewed include previous OV with myself as well as labs.   New Medications Ordered This Visit   Medications    levothyroxine (SYNTHROID, LEVOTHROID) 25 MCG tablet     Sig: Take 1 tablet by  mouth Every Morning.     Dispense:  90 tablet     Refill:  0    celecoxib (CeleBREX) 200 MG capsule     Sig: Take 1 capsule by mouth Daily.     Dispense:  30 capsule     Refill:  1        Follow Up   Return in about 6 weeks (around 1/14/2025) for 30--Lab B4 FUP.  Patient was given instructions and counseling regarding her condition or for health maintenance advice. Please see specific information pulled into the AVS if appropriate.

## 2024-12-01 NOTE — ASSESSMENT & PLAN NOTE
Patient's (Body mass index is 39.59 kg/m².) indicates that they are obese (BMI >30) with health conditions that include obstructive sleep apnea, hypertension, dyslipidemias, and osteoarthritis . Weight is worsening. BMI  is above average; BMI management plan is completed. We discussed portion control, increasing exercise, and Weight Watchers or other Commercial based weight reduction program.

## 2024-12-03 ENCOUNTER — OFFICE VISIT (OUTPATIENT)
Dept: INTERNAL MEDICINE | Facility: CLINIC | Age: 79
End: 2024-12-03
Payer: MEDICARE

## 2024-12-03 VITALS
BODY MASS INDEX: 40.02 KG/M2 | HEIGHT: 66 IN | WEIGHT: 249 LBS | HEART RATE: 88 BPM | TEMPERATURE: 98.2 F | RESPIRATION RATE: 16 BRPM | DIASTOLIC BLOOD PRESSURE: 84 MMHG | SYSTOLIC BLOOD PRESSURE: 130 MMHG

## 2024-12-03 DIAGNOSIS — E03.8 SUBCLINICAL HYPOTHYROIDISM: Chronic | ICD-10-CM

## 2024-12-03 DIAGNOSIS — I10 ESSENTIAL HYPERTENSION: Chronic | ICD-10-CM

## 2024-12-03 DIAGNOSIS — Z86.2 HISTORY OF ANEMIA: ICD-10-CM

## 2024-12-03 DIAGNOSIS — Z00.00 MEDICARE ANNUAL WELLNESS VISIT, SUBSEQUENT: Primary | ICD-10-CM

## 2024-12-03 DIAGNOSIS — Z12.11 COLON CANCER SCREENING: ICD-10-CM

## 2024-12-03 DIAGNOSIS — E66.9 OBESITY (BMI 30-39.9): Chronic | ICD-10-CM

## 2024-12-03 DIAGNOSIS — G89.29 CHRONIC MIDLINE LOW BACK PAIN WITHOUT SCIATICA: Chronic | ICD-10-CM

## 2024-12-03 DIAGNOSIS — E78.5 HYPERLIPIDEMIA, UNSPECIFIED HYPERLIPIDEMIA TYPE: Chronic | ICD-10-CM

## 2024-12-03 DIAGNOSIS — R73.03 PREDIABETES: ICD-10-CM

## 2024-12-03 DIAGNOSIS — M54.50 CHRONIC MIDLINE LOW BACK PAIN WITHOUT SCIATICA: Chronic | ICD-10-CM

## 2024-12-03 PROCEDURE — G0439 PPPS, SUBSEQ VISIT: HCPCS | Performed by: NURSE PRACTITIONER

## 2024-12-03 PROCEDURE — 99214 OFFICE O/P EST MOD 30 MIN: CPT | Performed by: NURSE PRACTITIONER

## 2024-12-03 PROCEDURE — 1126F AMNT PAIN NOTED NONE PRSNT: CPT | Performed by: NURSE PRACTITIONER

## 2024-12-03 PROCEDURE — 3079F DIAST BP 80-89 MM HG: CPT | Performed by: NURSE PRACTITIONER

## 2024-12-03 PROCEDURE — 3075F SYST BP GE 130 - 139MM HG: CPT | Performed by: NURSE PRACTITIONER

## 2024-12-03 RX ORDER — LEVOTHYROXINE SODIUM 25 UG/1
25 TABLET ORAL
Qty: 90 TABLET | Refills: 0 | Status: SHIPPED | OUTPATIENT
Start: 2024-12-03

## 2024-12-03 RX ORDER — CELECOXIB 200 MG/1
200 CAPSULE ORAL DAILY
Qty: 30 CAPSULE | Refills: 1 | Status: SHIPPED | OUTPATIENT
Start: 2024-12-03

## 2025-01-03 DIAGNOSIS — E03.8 SUBCLINICAL HYPOTHYROIDISM: Chronic | ICD-10-CM

## 2025-01-10 LAB
T4 FREE SERPL-MCNC: 1.09 NG/DL (ref 0.92–1.68)
TSH SERPL DL<=0.005 MIU/L-ACNC: 3.47 UIU/ML (ref 0.27–4.2)

## 2025-01-14 PROBLEM — E03.8 SUBCLINICAL HYPOTHYROIDISM: Chronic | Status: ACTIVE | Noted: 2022-12-01

## 2025-01-14 PROBLEM — R91.1 PULMONARY NODULE: Chronic | Status: ACTIVE | Noted: 2024-05-25

## 2025-01-14 NOTE — PROGRESS NOTES
Subjective   Chief Complaint   Patient presents with    Hypertension     6 week fup        History of Present Illness   79 y.o. female presents for follow up regarding HTN, obesity, hypothyroidism and chronic low back pain for which she was prescribed short course of Celebrex and PT. Gabapentin 300 mg bid added by her psychiatrist; it has helped her pain at night. She has tried Celebrex in the past with success. Tylenol 1000 mg tid has helped. She is having dry needling in her back which has improved back pain 70%. Working on engaging her core. Down 9#. She hasn't restarted WW but knows the program well and is implementing it to her everyday life. Looking into warm water aerobic. Mailed in her papers to schedule her CLS. Asking about right pointer around the nail; she had it drained at  since she saw me last as it got infected. It has stayed pink since then. No swelling. If she pushes on it really hard it hurts but is otherwise ok.      Patient Active Problem List   Diagnosis    Mixed anxiety depressive disorder    Hyperlipidemia    Prediabetes    Obesity (BMI 30-39.9)    Hypersomnia with sleep apnea    Asthma    GERD (gastroesophageal reflux disease)    Arthritis of left knee    Essential hypertension    Hypothyroidism    Coronary artery calcification seen on CT scan    Ascending aorta dilation    Pulmonary nodule       Allergies   Allergen Reactions    Adhesive Tape Hives    Pantoprazole Nausea Only     Other reaction(s): Headache    Penicillins Hives    Nickel Rash     With cheap jewelry    Troleandomycin Hives       Current Outpatient Medications on File Prior to Visit   Medication Sig Dispense Refill    aspirin 81 MG EC tablet Take 1 tablet by mouth Daily.      Azelastine-Fluticasone 137-50 MCG/ACT suspension       budesonide-formoterol (SYMBICORT) 160-4.5 MCG/ACT inhaler Symbicort 160-4.5 MCG/ACT Inhalation Aerosol; Patient Sig: Symbicort 160-4.5 MCG/ACT Inhalation Aerosol ; 10; 0; 28-Jul-2015; Active       buPROPion XL (WELLBUTRIN XL) 150 MG 24 hr tablet       celecoxib (CeleBREX) 200 MG capsule Take 1 capsule by mouth Daily. 30 capsule 1    citalopram (CeleXA) 20 MG tablet       clobetasol (TEMOVATE) 0.05 % ointment APPLY THIN LAYER (0.5 GRAM) TO AREA OF IRRITATION TWICE DAILY X TWO WEEKS THEN ONCE DAILY AT BEDTIME      Dupixent 300 MG/2ML solution pen-injector       EPINEPHrine (EPIPEN) 0.3 MG/0.3ML solution auto-injector injection EpiPen 2-Mitch 0.3 MG/0.3ML Injection Solution Auto-injector; Patient Sig: EpiPen 2-Mitch 0.3 MG/0.3ML Injection Solution Auto-injector ; 2; 0; 21-Nov-2014; Active      esomeprazole (nexIUM) 20 MG capsule       estradiol (ESTRACE) 0.1 MG/GM vaginal cream       Fish Oil-Krill Oil (KRILL OIL PLUS) capsule Take  by mouth.      fluticasone (FLONASE) 50 MCG/ACT nasal spray       gabapentin (NEURONTIN) 300 MG capsule       montelukast (SINGULAIR) 10 MG tablet Take  by mouth.      Ventolin  (90 Base) MCG/ACT inhaler Inhale 2 puffs Every 4 (Four) Hours As Needed for Wheezing. 18 g 0    [DISCONTINUED] amLODIPine (NORVASC) 5 MG tablet TAKE 1 TABLET DAILY 90 tablet 3    [DISCONTINUED] gabapentin (NEURONTIN) 100 MG capsule Take 1 capsule by mouth.      [DISCONTINUED] levothyroxine (SYNTHROID, LEVOTHROID) 25 MCG tablet Take 1 tablet by mouth Every Morning. 90 tablet 0    [DISCONTINUED] rosuvastatin (Crestor) 20 MG tablet Take 1 tablet by mouth Daily. 90 tablet 3    Cholecalciferol 25 MCG (1000 UT) tablet Take 1 tablet by mouth Daily.      fluocinonide (LIDEX) 0.05 % external solution  (Patient not taking: Reported on 1/16/2025)       No current facility-administered medications on file prior to visit.       Past Medical History:   Diagnosis Date    Achilles tendinitis of right lower extremity     Esophageal stricture     GERD (gastroesophageal reflux disease)     Hemorrhoids 11/24/2015    Hyperlipidemia     Inflammatory fibroid polyps of stomach        Family History   Problem Relation Age of Onset     Depression Mother     Depression Brother     Hypertension Daughter     Hypertension Son        Social History     Socioeconomic History    Marital status:    Tobacco Use    Smoking status: Former     Current packs/day: 0.00     Types: Cigarettes     Quit date:      Years since quittin.0     Passive exposure: Never    Smokeless tobacco: Never   Vaping Use    Vaping status: Never Used   Substance and Sexual Activity    Alcohol use: Not Currently    Drug use: Never    Sexual activity: Defer       Past Surgical History:   Procedure Laterality Date    BREAST SURGERY      CHOLECYSTECTOMY      COLONOSCOPY      INCISIONAL BREAST BIOPSY      OOPHORECTOMY      REPLACEMENT TOTAL KNEE Left     TONSILLECTOMY AND ADENOIDECTOMY      TOTAL SHOULDER ARTHROPLASTY         The following portions of the patient's history were reviewed and updated as appropriate: problem list, allergies, current medications, past medical history, and past social history.    Review of Systems    Immunization History   Administered Date(s) Administered    Arexvy (RSV, Adults 60+ yrs) 2024    COVID-19 (PFIZER) 12YRS+ (COMIRNATY) 2024, 10/23/2024    COVID-19 (PFIZER) Purple Cap Monovalent 2021, 2021, 2021, 10/23/2021    Covid-19 (Pfizer) Gray Cap Monovalent 2022    FLUAD TRI 65YR+ 10/06/2020, 10/23/2024    Flu Vaccine Quad PF >36MO 2018    Fluzone  >6mos 2017    Fluzone High-Dose 65+YRS 10/01/2015, 2018, 10/08/2019    Fluzone High-Dose 65+yrs 2021, 2022, 10/05/2023    Hepatitis A 2018, 2018    Influenza TIV (IM) 2017    Influenza, Unspecified 10/15/2015    Pneumococcal Conjugate 13-Valent (PCV13) 2015    Pneumococcal Polysaccharide (PPSV23) 10/19/2020    Shingrix 10/30/2019, 10/06/2020, 10/23/2020    TD Preservative Free (Tenivac) 07/15/2021    Td, Unspecified 08/15/2006    Tdap 2016    Zostavax 02/15/2007       Objective   Vitals:    25  "0816 01/16/25 0855   BP:  124/76   Pulse:  88   Resp:  16   Temp: 97.8 °F (36.6 °C)    Weight: 109 kg (240 lb 9.6 oz)    Height: 168.9 cm (66.5\")      Body mass index is 38.26 kg/m².  Physical Exam  Vitals reviewed.   Constitutional:       Appearance: Normal appearance. She is well-developed.   HENT:      Head: Normocephalic and atraumatic.   Cardiovascular:      Rate and Rhythm: Normal rate and regular rhythm.      Heart sounds: Normal heart sounds, S1 normal and S2 normal.   Pulmonary:      Effort: Pulmonary effort is normal.      Breath sounds: Normal breath sounds.   Musculoskeletal:      Comments: Ambulates without assistance; no clubbing, cyanosis or edema. Right pointer DIP, PIP without swelling or erythema. NTTP. Base of nail bed slightly pink without swelling. NTTP.    Skin:     General: Skin is warm and dry.   Neurological:      Mental Status: She is alert.   Psychiatric:         Mood and Affect: Mood normal.         Behavior: Behavior normal.         Procedures    Assessment & Plan   Diagnoses and all orders for this visit:    1. Essential hypertension (Primary)  Comments:  Controlled. Continue Amlodipine 5 mg daily and RTO in June for regular follow up visit with fasting labs prior.  Orders:  -     amLODIPine (NORVASC) 5 MG tablet; Take 1 tablet by mouth Daily.  Dispense: 90 tablet; Refill: 1    2. Hypothyroidism  Comments:  Euthyroid with replacement. Continue Leothyroxine 25 mcg daily and repeat TSH in 6M.  Orders:  -     levothyroxine (SYNTHROID, LEVOTHROID) 25 MCG tablet; Take 1 tablet by mouth Every Morning.  Dispense: 90 tablet; Refill: 1  -     TSH; Future  -     T4, free; Future    3. Obesity (BMI 30-39.9)  Comments:  Down 9#. Weight loss via dietary changes and reguar exercise encouraged.    4. Chronic midline low back pain without sciatica  Comments:  70% better with PT which she will continue. Looking into warm water aerobics. Continue weight loss. Tylenol preferred to NSAID for her kidney " health but not op  Orders:  -     acetaminophen (TYLENOL) 500 MG tablet; 1000 mg tid prn    5. Hyperlipidemia, unspecified hyperlipidemia type  -     rosuvastatin (Crestor) 20 MG tablet; Take 1 tablet by mouth Daily.  Dispense: 90 tablet; Refill: 3    Records reviewed include previous OV with myself as well as labs.     Return in about 5 months (around 6/9/2025) for 30.

## 2025-01-15 DIAGNOSIS — I10 ESSENTIAL HYPERTENSION: Chronic | ICD-10-CM

## 2025-01-15 RX ORDER — AMLODIPINE BESYLATE 5 MG/1
5 TABLET ORAL DAILY
Qty: 90 TABLET | Refills: 3 | Status: SHIPPED | OUTPATIENT
Start: 2025-01-15 | End: 2025-01-16 | Stop reason: SDUPTHER

## 2025-01-16 ENCOUNTER — OFFICE VISIT (OUTPATIENT)
Dept: INTERNAL MEDICINE | Facility: CLINIC | Age: 80
End: 2025-01-16
Payer: MEDICARE

## 2025-01-16 VITALS
BODY MASS INDEX: 38.67 KG/M2 | TEMPERATURE: 97.8 F | HEART RATE: 88 BPM | HEIGHT: 66 IN | SYSTOLIC BLOOD PRESSURE: 124 MMHG | RESPIRATION RATE: 16 BRPM | DIASTOLIC BLOOD PRESSURE: 76 MMHG | WEIGHT: 240.6 LBS

## 2025-01-16 DIAGNOSIS — E66.9 OBESITY (BMI 30-39.9): Chronic | ICD-10-CM

## 2025-01-16 DIAGNOSIS — I10 ESSENTIAL HYPERTENSION: Primary | Chronic | ICD-10-CM

## 2025-01-16 DIAGNOSIS — E78.5 HYPERLIPIDEMIA, UNSPECIFIED HYPERLIPIDEMIA TYPE: Chronic | ICD-10-CM

## 2025-01-16 DIAGNOSIS — M54.50 CHRONIC MIDLINE LOW BACK PAIN WITHOUT SCIATICA: Chronic | ICD-10-CM

## 2025-01-16 DIAGNOSIS — E03.8 OTHER SPECIFIED HYPOTHYROIDISM: Chronic | ICD-10-CM

## 2025-01-16 DIAGNOSIS — G89.29 CHRONIC MIDLINE LOW BACK PAIN WITHOUT SCIATICA: Chronic | ICD-10-CM

## 2025-01-16 PROCEDURE — 1159F MED LIST DOCD IN RCRD: CPT | Performed by: NURSE PRACTITIONER

## 2025-01-16 PROCEDURE — G2211 COMPLEX E/M VISIT ADD ON: HCPCS | Performed by: NURSE PRACTITIONER

## 2025-01-16 PROCEDURE — 1160F RVW MEDS BY RX/DR IN RCRD: CPT | Performed by: NURSE PRACTITIONER

## 2025-01-16 PROCEDURE — 99214 OFFICE O/P EST MOD 30 MIN: CPT | Performed by: NURSE PRACTITIONER

## 2025-01-16 PROCEDURE — 3074F SYST BP LT 130 MM HG: CPT | Performed by: NURSE PRACTITIONER

## 2025-01-16 PROCEDURE — 3078F DIAST BP <80 MM HG: CPT | Performed by: NURSE PRACTITIONER

## 2025-01-16 PROCEDURE — 1126F AMNT PAIN NOTED NONE PRSNT: CPT | Performed by: NURSE PRACTITIONER

## 2025-01-16 RX ORDER — ROSUVASTATIN CALCIUM 20 MG/1
20 TABLET, COATED ORAL DAILY
Qty: 90 TABLET | Refills: 3 | Status: SHIPPED | OUTPATIENT
Start: 2025-01-16

## 2025-01-16 RX ORDER — CHOLECALCIFEROL (VITAMIN D3) 25 MCG
1000 TABLET ORAL DAILY
COMMUNITY

## 2025-01-16 RX ORDER — AMLODIPINE BESYLATE 5 MG/1
5 TABLET ORAL DAILY
Qty: 90 TABLET | Refills: 1 | Status: SHIPPED | OUTPATIENT
Start: 2025-01-16

## 2025-01-16 RX ORDER — LEVOTHYROXINE SODIUM 25 UG/1
25 TABLET ORAL
Qty: 90 TABLET | Refills: 1 | Status: SHIPPED | OUTPATIENT
Start: 2025-01-16

## 2025-01-16 RX ORDER — CELECOXIB 200 MG/1
200 CAPSULE ORAL DAILY
Qty: 30 CAPSULE | Refills: 1 | Status: CANCELLED | OUTPATIENT
Start: 2025-01-16

## 2025-01-16 RX ORDER — ACETAMINOPHEN 500 MG
TABLET ORAL
Start: 2025-01-16

## 2025-01-16 RX ORDER — GABAPENTIN 300 MG/1
CAPSULE ORAL
COMMUNITY
Start: 2025-01-02

## 2025-02-17 ENCOUNTER — HOSPITAL ENCOUNTER (OUTPATIENT)
Dept: CT IMAGING | Facility: HOSPITAL | Age: 80
Discharge: HOME | End: 2025-02-17
Admitting: INTERNAL MEDICINE
Payer: MEDICARE

## 2025-02-17 DIAGNOSIS — R91.8 PULMONARY NODULES: ICD-10-CM

## 2025-02-17 PROCEDURE — 71250 CT THORAX DX C-: CPT

## 2025-02-24 PROBLEM — J84.10 PULMONARY FIBROSIS: Status: ACTIVE | Noted: 2025-02-24

## 2025-03-28 NOTE — PROGRESS NOTES
Subjective   Chief Complaint   Patient presents with    Back Pain     HISTORY OF PRESENT ILLNESS  79 y.o. female presents with cc recurrent  and worsening low back pain over the last month. NKI. Pain initially improved with PT. NKI. Describes a burning pain that does not radiate to her legs or feet. Walking bent over due to aching pain that radiates into her buttocks. Feels stiff all day and worse when she stands. Denies numbness or tingling in legs; lower extremity weakness; bowel or bladder incontinence.     Continues PT exercises at home. Tylenol caused her to have diarrhea so not taking anything. No Celebrex for several months; it never really helped.     Not yet scheduled CLS; she sent in the paperwork though.      Patient Active Problem List   Diagnosis    Mixed anxiety depressive disorder    Hyperlipidemia    Prediabetes    Obesity (BMI 30-39.9)    Hypersomnia with sleep apnea    Asthma    GERD (gastroesophageal reflux disease)    Arthritis of left knee    Essential hypertension    Hypothyroidism    Coronary artery calcification seen on CT scan    Ascending aorta dilation    Pulmonary nodule    Pulmonary fibrosis       Allergies   Allergen Reactions    Adhesive Tape Hives    Pantoprazole Nausea Only     Other reaction(s): Headache    Penicillins Hives    Nickel Rash     With cheap jewelry    Troleandomycin Hives       Current Outpatient Medications on File Prior to Visit   Medication Sig Dispense Refill    amLODIPine (NORVASC) 5 MG tablet Take 1 tablet by mouth Daily. 90 tablet 1    aspirin 81 MG EC tablet Take 1 tablet by mouth Daily.      Azelastine-Fluticasone 137-50 MCG/ACT suspension       budesonide-formoterol (SYMBICORT) 160-4.5 MCG/ACT inhaler Symbicort 160-4.5 MCG/ACT Inhalation Aerosol; Patient Sig: Symbicort 160-4.5 MCG/ACT Inhalation Aerosol ; 10; 0; 28-Jul-2015; Active      buPROPion XL (WELLBUTRIN XL) 150 MG 24 hr tablet       celecoxib (CeleBREX) 200 MG capsule Take 1 capsule by mouth Daily. 30  capsule 1    Cholecalciferol 25 MCG (1000 UT) tablet Take 1 tablet by mouth Daily.      citalopram (CeleXA) 20 MG tablet       clobetasol (TEMOVATE) 0.05 % ointment APPLY THIN LAYER (0.5 GRAM) TO AREA OF IRRITATION TWICE DAILY X TWO WEEKS THEN ONCE DAILY AT BEDTIME      Dupixent 300 MG/2ML solution pen-injector       EPINEPHrine (EPIPEN) 0.3 MG/0.3ML solution auto-injector injection EpiPen 2-Mitch 0.3 MG/0.3ML Injection Solution Auto-injector; Patient Sig: EpiPen 2-Mitch 0.3 MG/0.3ML Injection Solution Auto-injector ; 2; 0; 21-Nov-2014; Active      esomeprazole (nexIUM) 20 MG capsule       estradiol (ESTRACE) 0.1 MG/GM vaginal cream       Fish Oil-Krill Oil (KRILL OIL PLUS) capsule Take  by mouth.      fluticasone (FLONASE) 50 MCG/ACT nasal spray       gabapentin (NEURONTIN) 300 MG capsule       levothyroxine (SYNTHROID, LEVOTHROID) 25 MCG tablet Take 1 tablet by mouth Every Morning. 90 tablet 1    montelukast (SINGULAIR) 10 MG tablet Take  by mouth.      rosuvastatin (Crestor) 20 MG tablet Take 1 tablet by mouth Daily. 90 tablet 3    Ventolin  (90 Base) MCG/ACT inhaler Inhale 2 puffs Every 4 (Four) Hours As Needed for Wheezing. 18 g 0    acetaminophen (TYLENOL) 500 MG tablet 1000 mg tid prn (Patient not taking: Reported on 3/31/2025)      [DISCONTINUED] fluocinonide (LIDEX) 0.05 % external solution  (Patient not taking: Reported on 3/31/2025)       No current facility-administered medications on file prior to visit.       Past Medical History:   Diagnosis Date    Achilles tendinitis of right lower extremity     Esophageal stricture     GERD (gastroesophageal reflux disease)     Hemorrhoids 11/24/2015    Hyperlipidemia     Inflammatory fibroid polyps of stomach        Family History   Problem Relation Age of Onset    Depression Mother     Depression Brother     Hypertension Daughter     Hypertension Son        Social History     Socioeconomic History    Marital status:    Tobacco Use    Smoking status:  "Former     Current packs/day: 0.00     Types: Cigarettes     Quit date:      Years since quittin.2     Passive exposure: Never    Smokeless tobacco: Never   Vaping Use    Vaping status: Never Used   Substance and Sexual Activity    Alcohol use: Not Currently    Drug use: Never    Sexual activity: Defer       Past Surgical History:   Procedure Laterality Date    BREAST SURGERY      CHOLECYSTECTOMY      COLONOSCOPY      INCISIONAL BREAST BIOPSY      OOPHORECTOMY      REPLACEMENT TOTAL KNEE Left     TONSILLECTOMY AND ADENOIDECTOMY      TOTAL SHOULDER ARTHROPLASTY         The following portions of the patient's history were reviewed and updated as appropriate: problem list, allergies, current medications, past medical history, and past social history.    Review of Systems    Immunization History   Administered Date(s) Administered    Arexvy (RSV, Adults 60+ yrs) 2024    COVID-19 (PFIZER) 12YRS+ (COMIRNATY) 2024, 10/23/2024    COVID-19 (PFIZER) Purple Cap Monovalent 2021, 2021, 2021, 10/23/2021    Covid-19 (Pfizer) Gray Cap Monovalent 2022    FLUAD TRI 65YR+ 10/06/2020, 10/23/2024    Flu Vaccine Quad PF >36MO 2018    Fluzone  >6mos 2017    Fluzone High-Dose 65+YRS 10/01/2015, 2018, 10/08/2019    Fluzone High-Dose 65+yrs 2021, 2022, 10/05/2023    Hepatitis A 2018, 2018    Influenza TIV (IM) 2017    Influenza, Unspecified 10/15/2015    Pneumococcal Conjugate 13-Valent (PCV13) 2015    Pneumococcal Polysaccharide (PPSV23) 10/19/2020    Shingrix 10/30/2019, 10/06/2020, 10/23/2020    TD Preservative Free (Tenivac) 07/15/2021    Td, Unspecified 08/15/2006    Tdap 2016    Zostavax 02/15/2007       Objective   Vitals:    25 1115   Temp: 98.1 °F (36.7 °C)   Weight: 110 kg (242 lb)   Height: 168.9 cm (66.5\")     Body mass index is 38.48 kg/m².  Physical Exam  Constitutional:       Appearance: Normal appearance. She is " obese.   HENT:      Head: Normocephalic and atraumatic.   Pulmonary:      Effort: Pulmonary effort is normal.   Musculoskeletal:         General: Normal range of motion.      Comments: Ambulates without assistance; no clubbing, cyanosis or edema. Lumbar spine NTTP. Paraspinal muscles below beltl ine into gluts TTP.    Skin:     General: Skin is warm and dry.   Neurological:      Mental Status: She is alert.   Psychiatric:         Mood and Affect: Mood normal.         Behavior: Behavior normal.         Procedures    Assessment & Plan   Diagnoses and all orders for this visit:    1. Chronic midline low back pain without sciatica (Primary)  Comments:  Continues PT exercises but no longer getting the relief she previously experienced. Tylenol causes diarrhea. Celebrex stopped for renal health. Degenerative changes noted on CT abd/pelvis 2023.   Orders:  -     MRI Lumbar Spine Without Contrast  -     Basic Metabolic Panel  -     methylPREDNISolone (MEDROL) 4 MG dose pack; Take as directed on package instructions.  Dispense: 21 tablet; Refill: 0    Records reviewed include previous OV with myself as well as labs.     Return for Lab Today, Next scheduled follow up.

## 2025-03-31 ENCOUNTER — OFFICE VISIT (OUTPATIENT)
Dept: INTERNAL MEDICINE | Facility: CLINIC | Age: 80
End: 2025-03-31
Payer: MEDICARE

## 2025-03-31 VITALS — HEIGHT: 66 IN | WEIGHT: 242 LBS | BODY MASS INDEX: 38.89 KG/M2 | TEMPERATURE: 98.1 F

## 2025-03-31 DIAGNOSIS — M54.50 CHRONIC MIDLINE LOW BACK PAIN WITHOUT SCIATICA: Primary | Chronic | ICD-10-CM

## 2025-03-31 DIAGNOSIS — E66.9 OBESITY (BMI 30-39.9): Chronic | ICD-10-CM

## 2025-03-31 DIAGNOSIS — G89.29 CHRONIC MIDLINE LOW BACK PAIN WITHOUT SCIATICA: Primary | Chronic | ICD-10-CM

## 2025-03-31 LAB
BUN SERPL-MCNC: 9 MG/DL (ref 8–23)
BUN/CREAT SERPL: 8.6 (ref 7–25)
CALCIUM SERPL-MCNC: 9.2 MG/DL (ref 8.6–10.5)
CHLORIDE SERPL-SCNC: 103 MMOL/L (ref 98–107)
CO2 SERPL-SCNC: 24.2 MMOL/L (ref 22–29)
CREAT SERPL-MCNC: 1.05 MG/DL (ref 0.57–1)
EGFRCR SERPLBLD CKD-EPI 2021: 54.5 ML/MIN/1.73
GLUCOSE SERPL-MCNC: 140 MG/DL (ref 65–99)
POTASSIUM SERPL-SCNC: 4 MMOL/L (ref 3.5–5.2)
SODIUM SERPL-SCNC: 141 MMOL/L (ref 136–145)

## 2025-03-31 RX ORDER — METHYLPREDNISOLONE 4 MG/1
TABLET ORAL
Qty: 21 TABLET | Refills: 0 | Status: SHIPPED | OUTPATIENT
Start: 2025-03-31

## 2025-04-14 NOTE — TELEPHONE ENCOUNTER
Learning About Managing Anger  What causes anger?  Many things can cause anger. Stress at home or at work can cause anger. Being in stressful social situations can also cause it.  Anger signals your body to prepare for a fight. This reaction is often called \"fight or flight.\" When you get angry, adrenaline and other hormones are released into your blood. Then your blood pressure goes up, your heart beats faster, and you breathe faster.  When you express anger in a healthy way, it can inspire change and make you productive. But if you don't have the skills to express anger in a healthy way, anger can build up. You may hurt others--and yourself--emotionally and even physically. Violent behavior often starts with verbal threats or fairly minor incidents. But over time, it can involve physical harm. It can include physical, verbal, or sexual abuse of an intimate partner (domestic violence), a child (child abuse), or an older adult (elder abuse).  It can also make you sick. Anger and constant hostility keep your blood pressure high. They raise your chances of having other health problems, such as depression, a heart attack, or a stroke. Some people with post-traumatic stress disorder (PTSD) feel angry and on alert all the time.  It may feel like there are no other ways to react when you are angry. But when you learn healthy ways to work with anger, it no longer controls you.  How can you manage your anger?  The first step to managing anger is to be more aware of it. Note the thoughts, feelings, and emotions that you have when you get angry. Practice noticing these signs of anger when you are calm. If you are more aware of the signs of anger, you can take steps to manage it. Here are a few tips:  Think before you act. Take time to stop and cool down when you feel yourself getting angry. Count to 10 while you take slow, steady breaths. Practice some other form of mental relaxation.  Learn the feelings that lead to  Please advise

## 2025-05-19 ENCOUNTER — TELEPHONE (OUTPATIENT)
Dept: INTERNAL MEDICINE | Facility: CLINIC | Age: 80
End: 2025-05-19
Payer: MEDICARE

## 2025-05-19 NOTE — TELEPHONE ENCOUNTER
"Caller: Luzmaria Pérez \"Ladonna\"    Relationship to patient: Self    Best call back number: [phone]     Chief complaint: COUGH, SORE THROAT, CHEST TIGHTNESS, DONT FEEL GOOD     Patient directed to call 911 or go to their nearest emergency room.     Patient verbalized understanding: [x] Yes  [] No    Additional notes:PATIENT STATED SHE WILL VISIT [facility] ER ON [address]     "

## 2025-05-21 ENCOUNTER — OFFICE VISIT (OUTPATIENT)
Dept: INTERNAL MEDICINE | Facility: CLINIC | Age: 80
End: 2025-05-21
Payer: MEDICARE

## 2025-05-21 VITALS
HEART RATE: 88 BPM | SYSTOLIC BLOOD PRESSURE: 112 MMHG | OXYGEN SATURATION: 95 % | WEIGHT: 242 LBS | HEIGHT: 66 IN | DIASTOLIC BLOOD PRESSURE: 70 MMHG | BODY MASS INDEX: 38.89 KG/M2

## 2025-05-21 DIAGNOSIS — J32.9 SINUSITIS, UNSPECIFIED CHRONICITY, UNSPECIFIED LOCATION: Primary | ICD-10-CM

## 2025-05-21 PROCEDURE — 3078F DIAST BP <80 MM HG: CPT | Performed by: STUDENT IN AN ORGANIZED HEALTH CARE EDUCATION/TRAINING PROGRAM

## 2025-05-21 PROCEDURE — 99213 OFFICE O/P EST LOW 20 MIN: CPT | Performed by: STUDENT IN AN ORGANIZED HEALTH CARE EDUCATION/TRAINING PROGRAM

## 2025-05-21 PROCEDURE — 87428 SARSCOV & INF VIR A&B AG IA: CPT | Performed by: STUDENT IN AN ORGANIZED HEALTH CARE EDUCATION/TRAINING PROGRAM

## 2025-05-21 PROCEDURE — 3074F SYST BP LT 130 MM HG: CPT | Performed by: STUDENT IN AN ORGANIZED HEALTH CARE EDUCATION/TRAINING PROGRAM

## 2025-05-21 PROCEDURE — 1126F AMNT PAIN NOTED NONE PRSNT: CPT | Performed by: STUDENT IN AN ORGANIZED HEALTH CARE EDUCATION/TRAINING PROGRAM

## 2025-05-21 RX ORDER — GUAIFENESIN 600 MG/1
600 TABLET, EXTENDED RELEASE ORAL 2 TIMES DAILY
Qty: 20 TABLET | Refills: 0 | Status: SHIPPED | OUTPATIENT
Start: 2025-05-21

## 2025-05-21 RX ORDER — PREDNISONE 20 MG/1
60 TABLET ORAL DAILY
COMMUNITY
Start: 2025-05-19 | End: 2025-05-24

## 2025-05-21 RX ORDER — GUAIFENESIN AND DEXTROMETHORPHAN HYDROBROMIDE 1200; 60 MG/1; MG/1
1 TABLET, EXTENDED RELEASE ORAL 2 TIMES DAILY
COMMUNITY
Start: 2025-05-21 | End: 2025-05-21

## 2025-05-21 RX ORDER — CEFDINIR 300 MG/1
300 CAPSULE ORAL 2 TIMES DAILY
Qty: 10 CAPSULE | Refills: 0 | Status: SHIPPED | OUTPATIENT
Start: 2025-05-21

## 2025-05-21 RX ORDER — BENZONATATE 100 MG/1
100 CAPSULE ORAL 3 TIMES DAILY PRN
Qty: 30 CAPSULE | Refills: 0 | Status: SHIPPED | OUTPATIENT
Start: 2025-05-21

## 2025-05-21 RX ORDER — ALPRAZOLAM 0.5 MG
0.5 TABLET ORAL DAILY
COMMUNITY
Start: 2025-03-20

## 2025-05-22 NOTE — PROGRESS NOTES
"Chief Complaint  Cough (Patient reports her cough seems worse and she has a bad sinus headache since starting pred 20 mg (patient was prescribed in the ER on 5/19).)    Subjective        Luzmaria Pérez presents to [facility]  Cough    #Cough  Symptoms persist, mildly improved since starting steroids, but feels like she has developed new sinus primary symptoms, facial pressure, pain, cough persists. Reviewed cxr without evidence of lobar consolidation or cardiopulmonary disease. Reported wheezing significantly improved.   Objective   Vital Signs:  /70 (BP Location: Left arm, Patient Position: Sitting)   Pulse 88   Ht 168.9 cm (66.5\")   Wt 110 kg (242 lb)   SpO2 95%   BMI 38.48 kg/m²   Estimated body mass index is 38.48 kg/m² as calculated from the following:    Height as of this encounter: 168.9 cm (66.5\").    Weight as of this encounter: 110 kg (242 lb).            Physical Exam  Constitutional:       General: She is not in acute distress.     Appearance: She is ill-appearing. She is not toxic-appearing.   HENT:      Nose: Congestion and rhinorrhea present.      Mouth/Throat:      Pharynx: Posterior oropharyngeal erythema present.   Eyes:      Extraocular Movements: Extraocular movements intact.      Pupils: Pupils are equal, round, and reactive to light.   Cardiovascular:      Rate and Rhythm: Regular rhythm. Tachycardia present.   Pulmonary:      Effort: Pulmonary effort is normal.      Breath sounds: Normal breath sounds.   Abdominal:      General: Abdomen is flat.      Palpations: Abdomen is soft.   Skin:     General: Skin is warm.   Neurological:      General: No focal deficit present.        Result Review :                Assessment and Plan   Diagnoses and all orders for this visit:    1. Sinusitis, unspecified chronicity, unspecified location (Primary)  -     benzonatate (Tessalon Perles) 100 MG capsule; Take 1 capsule by mouth 3 (Three) Times a Day As Needed for Cough. "  Dispense: 30 capsule; Refill: 0  -     guaiFENesin (Mucinex) 600 MG 12 hr tablet; Take 1 tablet by mouth 2 (Two) Times a Day.  Dispense: 20 tablet; Refill: 0  -     cefdinir (OMNICEF) 300 MG capsule; Take 1 capsule by mouth 2 (Two) Times a Day.  Dispense: 10 capsule; Refill: 0    Other orders  -     Discontinue: Dextromethorphan-guaiFENesin (Mucinex DM Maximum Strength)  MG tablet sustained-release 12 hour; Take 1 tablet by mouth 2 (Two) Times a Day for 10 days.      Will treat with Omnicef given worsened headaches, my concern for potential new sinusitis in setting of previous viral illness, will send Omnicef which she is tolerated in the past see if we can get progress with this.       Follow Up   No follow-ups on file.  Patient was given instructions and counseling regarding her condition or for health maintenance advice. Please see specific information pulled into the AVS if appropriate.     Will treat with mucinex, omnicef for sinusitis given history of amoxil allergy and previous tolerance of omnicef. Rtc w/ new worsened symptoms.

## 2025-05-23 ENCOUNTER — TELEPHONE (OUTPATIENT)
Dept: INTERNAL MEDICINE | Facility: CLINIC | Age: 80
End: 2025-05-23

## 2025-05-23 NOTE — TELEPHONE ENCOUNTER
PATIENT CALLED TO RESCHEDULE LAB APPOINTMENT    PLEASE CALL [phone]    ATTEMPTED WARM TRANSFER

## 2025-06-20 ENCOUNTER — HOSPITAL ENCOUNTER (OUTPATIENT)
Facility: HOSPITAL | Age: 80
Discharge: HOME | End: 2025-06-20
Payer: MEDICARE

## 2025-06-20 PROCEDURE — 72148 MRI LUMBAR SPINE W/O DYE: CPT

## 2025-06-20 PROCEDURE — 76014 MR SFTY IMPLT&/FB ASMT STF 1: CPT

## 2025-06-24 DIAGNOSIS — E03.8 OTHER SPECIFIED HYPOTHYROIDISM: Chronic | ICD-10-CM

## 2025-06-24 RX ORDER — LEVOTHYROXINE SODIUM 25 UG/1
25 TABLET ORAL EVERY MORNING
Qty: 90 TABLET | Refills: 3 | Status: SHIPPED | OUTPATIENT
Start: 2025-06-24

## 2025-07-02 RX ORDER — AZELASTINE 1 MG/ML
SPRAY, METERED NASAL
COMMUNITY
Start: 2025-05-21

## 2025-07-02 NOTE — PROGRESS NOTES
Subjective   Chief Complaint   Patient presents with    Hypertension    Hypothyroidism    Prediabetes       History of Present Illness   79 y.o. female presents for follow up regarding HTN, prediabetes, hypothyroidism and obesity. Energy unchanged. No chest pain. Dyspnea with her asthma is stable. Taste and smell after viral infection last month. She tossed the Ruth Kunstadter â€“ [employer] bars after seeing her A1C was up. Working on lowering CHOs and avoiding sweets. Weight down 6#. Scheduled with pain management regarding chronic low back pain.     Patient Active Problem List   Diagnosis    Mixed anxiety depressive disorder    Hyperlipidemia    Prediabetes    Obesity (BMI 30-39.9)    Hypersomnia with sleep apnea    Asthma    GERD (gastroesophageal reflux disease)    Arthritis of left knee    Essential hypertension    Hypothyroidism    Coronary artery calcification seen on CT scan    Ascending aorta dilation    Pulmonary nodule    Pulmonary fibrosis       Allergies   Allergen Reactions    Adhesive Tape Hives    Pantoprazole Nausea Only     Other reaction(s): Headache    Penicillins Hives    Nickel Rash     With cheap jewelry    Troleandomycin Hives       Current Outpatient Medications on File Prior to Visit   Medication Sig Dispense Refill    amLODIPine (NORVASC) 5 MG tablet Take 1 tablet by mouth Daily. 90 tablet 1    aspirin 81 MG EC tablet Take 1 tablet by mouth Daily.      azelastine (ASTELIN) 0.1 % nasal spray       budesonide-formoterol (SYMBICORT) 160-4.5 MCG/ACT inhaler Symbicort 160-4.5 MCG/ACT Inhalation Aerosol; Patient Sig: Symbicort 160-4.5 MCG/ACT Inhalation Aerosol ; 10; 0; 28-Jul-2015; Active      buPROPion XL (WELLBUTRIN XL) 150 MG 24 hr tablet       Cholecalciferol 25 MCG (1000 UT) tablet Take 1 tablet by mouth Daily.      citalopram (CeleXA) 20 MG tablet       clobetasol (TEMOVATE) 0.05 % ointment APPLY THIN LAYER (0.5 GRAM) TO AREA OF IRRITATION TWICE DAILY X TWO WEEKS THEN ONCE DAILY AT BEDTIME      Dupixent 300  MG/2ML solution pen-injector       EPINEPHrine (EPIPEN) 0.3 MG/0.3ML solution auto-injector injection EpiPen 2-Mitch 0.3 MG/0.3ML Injection Solution Auto-injector; Patient Sig: EpiPen 2-Mitch 0.3 MG/0.3ML Injection Solution Auto-injector ; 2; 0; 2014; Active      esomeprazole (nexIUM) 20 MG capsule       estradiol (ESTRACE) 0.1 MG/GM vaginal cream       Fish Oil-Krill Oil (KRILL OIL PLUS) capsule Take  by mouth.      fluticasone (FLONASE) 50 MCG/ACT nasal spray       gabapentin (NEURONTIN) 300 MG capsule       levothyroxine (SYNTHROID, LEVOTHROID) 25 MCG tablet TAKE 1 TABLET EVERY MORNING 90 tablet 3    montelukast (SINGULAIR) 10 MG tablet Take  by mouth.      rosuvastatin (Crestor) 20 MG tablet Take 1 tablet by mouth Daily. 90 tablet 3    Ventolin  (90 Base) MCG/ACT inhaler Inhale 2 puffs Every 4 (Four) Hours As Needed for Wheezing. 18 g 0     No current facility-administered medications on file prior to visit.       Past Medical History:   Diagnosis Date    Achilles tendinitis of right lower extremity     Allergic     Anxiety     Arthritis     Asthma     Cancer ?    Skin    Cataract     Cholelithiasis     Depression     Esophageal stricture     GERD (gastroesophageal reflux disease)     Headache     Hemorrhoids 2015    HL (hearing loss)     Hyperlipidemia     Hypertension     Hypothyroidism     Inflammatory fibroid polyps of stomach     Obesity     Osteopenia     Pneumonia     Renal insufficiency     Urinary tract infection     Visual impairment        Family History   Problem Relation Age of Onset    Depression Mother     Depression Brother     Hypertension Daughter     Hypertension Son     Early death Brother        Social History     Socioeconomic History    Marital status:    Tobacco Use    Smoking status: Former     Current packs/day: 0.00     Average packs/day: 1 pack/day for 12.0 years (12.0 ttl pk-yrs)     Types: Cigarettes     Quit date:      Years since quittin.5      "Passive exposure: Never    Smokeless tobacco: Never    Tobacco comments:     Quit Aug 1980   Vaping Use    Vaping status: Never Used   Substance and Sexual Activity    Alcohol use: Not Currently    Drug use: Never    Sexual activity: Not Currently     Partners: Male     Birth control/protection: Post-menopausal       Past Surgical History:   Procedure Laterality Date    ADENOIDECTOMY      BREAST SURGERY      CHOLECYSTECTOMY      COLONOSCOPY      INCISIONAL BREAST BIOPSY      JOINT REPLACEMENT      OOPHORECTOMY      REPLACEMENT TOTAL KNEE Left     SUBTOTAL HYSTERECTOMY      TONSILLECTOMY AND ADENOIDECTOMY      TOTAL SHOULDER ARTHROPLASTY         The following portions of the patient's history were reviewed and updated as appropriate: problem list, allergies, current medications, past medical history, and past social history.    Review of Systems    Immunization History   Administered Date(s) Administered    Arexvy (RSV, Adults 60+ yrs) 05/30/2024    COVID-19 (PFIZER) 12YRS+ (COMIRNATY) 02/22/2024, 10/23/2024    COVID-19 (PFIZER) Purple Cap Monovalent 02/04/2021, 02/25/2021, 04/11/2021, 10/23/2021    Covid-19 (Pfizer) Gray Cap Monovalent 05/16/2022    FLUAD TRI 65YR+ 10/06/2020, 10/23/2024    Flu Vaccine Quad PF >36MO 09/18/2018    Fluzone  >6mos 09/20/2017    Fluzone High-Dose 65+YRS 10/01/2015, 09/18/2018, 10/08/2019    Fluzone High-Dose 65+yrs 08/25/2021, 11/03/2022, 10/05/2023    Hepatitis A 04/26/2018, 11/02/2018    Influenza TIV (IM) 09/20/2017    Influenza, Unspecified 10/15/2015    Pneumococcal Conjugate 13-Valent (PCV13) 09/28/2015    Pneumococcal Polysaccharide (PPSV23) 10/19/2020    Shingrix 10/30/2019, 10/06/2020, 10/23/2020    TD Preservative Free (Tenivac) 07/15/2021    Td, Unspecified 08/15/2006    Tdap 09/06/2016    Zostavax 02/15/2007       Objective   Vitals:    07/03/25 1303   BP: 104/60   Pulse: 92   Resp: 16   Temp: 98 °F (36.7 °C)   Weight: 107 kg (236 lb)   Height: 168.9 cm (66.5\")     Body mass " index is 37.53 kg/m².  Physical Exam  Vitals reviewed.   Constitutional:       Appearance: Normal appearance. She is well-developed. She is obese.   HENT:      Head: Normocephalic and atraumatic.   Neck:      Thyroid: No thyroid mass, thyromegaly or thyroid tenderness.   Cardiovascular:      Rate and Rhythm: Normal rate and regular rhythm.      Heart sounds: Normal heart sounds, S1 normal and S2 normal.   Pulmonary:      Effort: Pulmonary effort is normal.      Breath sounds: Normal breath sounds.   Skin:     General: Skin is warm and dry.   Neurological:      Mental Status: She is alert.   Psychiatric:         Mood and Affect: Mood normal.         Behavior: Behavior normal.         Procedures    Assessment & Plan   Diagnoses and all orders for this visit:    1. Essential hypertension (Primary)  Comments:  Controlled. Continue current medications and RTO 12/2025.    2. Hypothyroidism  Comments:  Recent viral illness. Repeat TSH in 6W.  Orders:  -     TSH Rfx On Abnormal To Free T4; Future  -     TSH Rfx On Abnormal To Free T4; Future    3. Prediabetes  Comments:  A1C up a bit to 5.9%. She is glad to see a nutritionist. She is working on cutting out sweets and decreasing CHOs.  Orders:  -     Hemoglobin A1c; Future  -     Ambulatory Referral to Nutrition Services    4. Obesity (BMI 30-39.9)  Comments:  Weight loss via dietary changes and 150 minutes weekly aerobic exercise.  Orders:  -     Ambulatory Referral to Nutrition Services    5. Hyperlipidemia, unspecified hyperlipidemia type  -     Comprehensive Metabolic Panel; Future  -     Lipid Panel With LDL / HDL Ratio; Future    Records reviewed include previous OV with myself as well as labs.     Return in about 22 weeks (around 12/4/2025) for Medicare Wellness, Lab B4 FUP.

## 2025-07-03 ENCOUNTER — OFFICE VISIT (OUTPATIENT)
Dept: INTERNAL MEDICINE | Facility: CLINIC | Age: 80
End: 2025-07-03
Payer: MEDICARE

## 2025-07-03 VITALS
DIASTOLIC BLOOD PRESSURE: 60 MMHG | WEIGHT: 236 LBS | BODY MASS INDEX: 37.93 KG/M2 | HEIGHT: 66 IN | TEMPERATURE: 98 F | SYSTOLIC BLOOD PRESSURE: 104 MMHG | HEART RATE: 92 BPM | RESPIRATION RATE: 16 BRPM

## 2025-07-03 DIAGNOSIS — E78.5 HYPERLIPIDEMIA, UNSPECIFIED HYPERLIPIDEMIA TYPE: Chronic | ICD-10-CM

## 2025-07-03 DIAGNOSIS — I10 ESSENTIAL HYPERTENSION: Primary | Chronic | ICD-10-CM

## 2025-07-03 DIAGNOSIS — E66.9 OBESITY (BMI 30-39.9): Chronic | ICD-10-CM

## 2025-07-03 DIAGNOSIS — R73.03 PREDIABETES: Chronic | ICD-10-CM

## 2025-07-03 DIAGNOSIS — E03.8 OTHER SPECIFIED HYPOTHYROIDISM: Chronic | ICD-10-CM

## 2025-07-03 NOTE — PATIENT INSTRUCTIONS
Please call Dr. Leeroy Hernández's office regarding scheduling your colonoscopy.     Please get your flu shot in the fall.

## 2025-07-21 ENCOUNTER — TELEPHONE (OUTPATIENT)
Dept: PAIN MEDICINE | Facility: CLINIC | Age: 80
End: 2025-07-21
Payer: MEDICARE

## 2025-07-21 NOTE — TELEPHONE ENCOUNTER
Called patient. Reminded her of her new patient appt tomorrow at 10:00 with Nellie CARMICHAEL. Asked her to arrive 10-15 min early for paperwork.

## 2025-07-22 ENCOUNTER — TRANSCRIBE ORDERS (OUTPATIENT)
Dept: SURGERY | Facility: SURGERY CENTER | Age: 80
End: 2025-07-22
Payer: MEDICARE

## 2025-07-22 ENCOUNTER — OFFICE VISIT (OUTPATIENT)
Dept: PAIN MEDICINE | Facility: CLINIC | Age: 80
End: 2025-07-22
Payer: MEDICARE

## 2025-07-22 ENCOUNTER — PREP FOR SURGERY (OUTPATIENT)
Dept: SURGERY | Facility: SURGERY CENTER | Age: 80
End: 2025-07-22
Payer: MEDICARE

## 2025-07-22 VITALS
HEIGHT: 67 IN | TEMPERATURE: 96.6 F | OXYGEN SATURATION: 94 % | RESPIRATION RATE: 20 BRPM | DIASTOLIC BLOOD PRESSURE: 83 MMHG | WEIGHT: 237.2 LBS | HEART RATE: 96 BPM | SYSTOLIC BLOOD PRESSURE: 134 MMHG | BODY MASS INDEX: 37.23 KG/M2

## 2025-07-22 DIAGNOSIS — Z41.9 SURGERY, ELECTIVE: Primary | ICD-10-CM

## 2025-07-22 DIAGNOSIS — M47.816 LUMBAR FACET ARTHROPATHY: Primary | ICD-10-CM

## 2025-07-22 PROCEDURE — 1125F AMNT PAIN NOTED PAIN PRSNT: CPT | Performed by: NURSE PRACTITIONER

## 2025-07-22 PROCEDURE — 3075F SYST BP GE 130 - 139MM HG: CPT | Performed by: NURSE PRACTITIONER

## 2025-07-22 PROCEDURE — 1159F MED LIST DOCD IN RCRD: CPT | Performed by: NURSE PRACTITIONER

## 2025-07-22 PROCEDURE — 1160F RVW MEDS BY RX/DR IN RCRD: CPT | Performed by: NURSE PRACTITIONER

## 2025-07-22 PROCEDURE — 3079F DIAST BP 80-89 MM HG: CPT | Performed by: NURSE PRACTITIONER

## 2025-07-22 PROCEDURE — 99214 OFFICE O/P EST MOD 30 MIN: CPT | Performed by: NURSE PRACTITIONER

## 2025-07-22 NOTE — PROGRESS NOTES
CHIEF COMPLAINT  Back pain  Mid low back pain radiates into her right hip when sitting.  Pain worsens with walking.  Pain is sharp onset 2 years got worse over the last 3 months.  She did PT 2003,2004 and last year continues with home exercises,has seen a chiropractor year ago.  No hx of sxs in the last year,denies back and neck sxs. She has never been to a pain clinic.  Denies LESI,patient uses ice and heat prn. Is not familiar with tens unit device.  Takes aleve prn,ibuprofen prn,gabapentin 300 mg  daily.     Subjective   Luzmaria Pérez is a 79 y.o. female  who presents for evaluation as a new patient  She has a history of LOW BACK PAIN. She was referred here by Keiry CRAMICHAEL(PCP).    Ladonna is 78 y/o  RHD female. She lives by herself.  Retired L[employer] and [employer]ing. She does not smoke, quit 1980. Does not drink alcohol use or illicit substance use.    Complains of pain in her low back. Occasionally can be worse on right. Generally does not radiate into her legs. Today her pain is 5/10VAS. Describes the pain as nearly continuous with activity. Pain increases with walking, standing, bending/lifting/twisting, household chores; pain decreases with rest, changing position, heating pad. Could not tolerate Tylenol due to diarrhea. Alternates between Aleve and ibuprofen but is cautious due to renal function.  Is on Gabapentin 300 mg from psychiatrist. Is working on weaning off.  ADL's by self. Denies any bowel or bladder incontinence specific to back pain. Reports history of stress incontinence.  Reports her pain can interfere with her sleep.    Back pain started approximately 2 years ago. No specific trauma or injury. Reports her pain is worsening. Saw PCP. Had PT. No long term benefit.  No relief with chiropractor and insurance no longer covers.       Past pain medications:  Tylenol-diarrhea     Current pain medications:   Ibuprofen- renal concerns  Aleve- renal concerns     Past therapies:  Physical Therapy:  YES  Chiropractor: YES- no relief, insurance does not cover  Massage Therapy: no  TENS: NO  Neck or back surgery: NO  Past pain management: NO      Back Pain  Chronicity:  Chronic  Onset:  More than 1 year ago  Frequency:  Constantly  Progression since onset:  Worsening  Pain location:  Lumbar spine  Pain quality:  Aching  Radiates to:  Does not radiate  Pain-numeric:  5/10  Pain is:  Worse during the day  Aggravated by:  Bending, sitting, standing, twisting and certain positions  Associated symptoms: weakness    Associated symptoms: no fever, no headaches and no numbness       PEG Assessment   What number best describes your pain on average in the past week?6  What number best describes how, during the past week, pain has interfered with your enjoyment of life?8  What number best describes how, during the past week, pain has interfered with your general activity?  8    Review of Pertinent Medical Data ---  Reviewed Keiry CARMICHAEL(PCP) office visit 3-31-25-- worsening low back pain. NKI. Pain initially improved with PT. was walking bent over due to aching pain that radiates into her buttocks.  Feels stiff all day and worse when she stands.  Continues PT exercises at home.  Tylenol caused her to have diarrhea.  Previously failed Celebrex.  Plan for MRI lumbar spine.  Also sent a Medrol Dosepak.    Reviewed Keiry CARMICHAEL(PCP) office visit 7-3-25--presents for follow-up on chronic health problems.  No chest pain.  Weight is down 6 pounds.  Schedule with pain management regarding low back pain.  Primary care needs were met.  Follow-up in about 22 weeks.  Labs before follow-up.    MRI OF THE LUMBAR SPINE WITHOUT CONTRAST ON 06/20/2025     CLINICAL HISTORY: This is a 79-year-old female patient with chronic  midline low back pain without sciatica.     TECHNIQUE: Sagittal T1, proton density and fast spin echo T2 and  fat-saturated fast spin-echo T2 weighted images were obtained of the  cervical spine. In  addition axial T2 weighted images were obtained from  T12 to S2 and thin-cut axial T1 and T2 weighted images were obtained  angled through the interspaces from L1 to S1.      FINDINGS: The distal thoracic cord and the conus is normal in signal  intensity. The conus terminates at the L1-2 interspace level which is  normal.     At T12-L1, the disc space and facets are normal in appearance with no  canal or foraminal narrowing.     At L1-2, there is a diffuse posterior disc osteophyte complex that  indents the ventral thecal sac and there is mild facet overgrowth and  ligamentum flavum thickening along the medial facet mildly indents the  right left posterolateral thecal sac and there is moderate narrowing of  the thecal sac with slight diminished spinal fluid bathing the cauda  equina at this level. There is no significant foraminal narrowing.     At L2-3, there is mild diffuse posterior disc osteophyte complex and  mild facet overgrowth. There is minimal if any canal narrowing and no  foraminal narrowing.     At L3-4, there is moderate-to-severe bilateral facet overgrowth. There  is a 4 to 5 mm degenerative anterolisthesis of L3 with respect to L4  with some uncovering of the posterior superior L3-4 disc space and disc  material bulging along the posterior inferior endplate of L3. There is  moderate-to-severe narrowing of the thecal sac, slight narrowing of the  lateral recesses, slight loss of vertical height of the foramen,  synovial thickening off the facets extending into the posterior foramen  and bulging disc material into the inferior foramina and there is mild  right and there is mild-to-moderate left foraminal narrowing.     At L4-5, there is moderate-to-severe bilateral facet overgrowth and  there is a 6 mm degenerative anterolisthesis of L4 with respect to L5  and there is mild canal narrowing. There is some narrowing of the  lateral recesses that could affect the traversing L5 nerve root and  there is  synovial thickening off the facets extending to the posterior  foramina and there is mild right and there is mild-to-moderate left  foraminal narrowing.     At L5-S1, there is mild right and mild-to-moderate left facet  overgrowth. Posterior disc margin is normal. I see no canal or lateral  recess narrowing. There is synovial thickening off the facets extending  into the posterior superior lateral aspect of the foramina and there is  mild right and minimal left foraminal narrowing.     IMPRESSION:  1. Lumbar spondylosis is present with multilevel canal narrowing and  some areas of foraminal narrowing as well.     2. At L1-2, there is a diffuse posterior disc osteophyte complex, mild  facet overgrowth and ligamentum flavum thickening of the anteromedial  facets and there is moderate narrowing of the thecal sac, but no  foraminal narrowing.     3. At L3-4, there is moderate-to-severe bilateral facet overgrowth and a  4 to 5 mm degenerative anterolisthesis of L3 with respect to L4 and  there is moderate-to-severe narrowing of the thecal sac with diminished  spinal fluid bathing the cauda equina at this level and there is slight  narrowing of the lateral recesses and there is mild right and  mild-to-moderate left foraminal narrowing.     4. At L4-5, there is moderate-to-severe bilateral facet overgrowth and a  6 mm degenerative anterolisthesis of L4 with respect to L5. There is  mild canal narrowing. There is some bilateral lateral recess narrowing  that could affect the traversing L5 nerve roots and there is mild right  and mild-to-moderate left foraminal narrowing. The remainder of the  lumbar spine MRI is unremarkable.     This report was finalized on 6/21/2025 8:22 AM by Dr. Raoul Cee M.D  on Workstation: [id number]             The following portions of the patient's history were reviewed and updated as appropriate: allergies, current medications, past family history, past medical history, past social history,  "past surgical history, and problem list.    Review of Systems   Constitutional:  Negative for activity change (less) and fever.   Gastrointestinal:  Negative for constipation and diarrhea.   Genitourinary:  Negative for difficulty urinating.   Musculoskeletal:  Positive for back pain.   Neurological:  Positive for dizziness (occ) and weakness. Negative for numbness and headaches.   Psychiatric/Behavioral:  Negative for agitation, sleep disturbance and suicidal ideas. The patient is not nervous/anxious.      I have reviewed and confirmed the accuracy of the ROS as documented by the MA/LPN/RN AMOL Rubi    Vitals:    07/22/25 1007   BP: 134/83   BP Location: Left arm   Patient Position: Sitting   Cuff Size: Adult   Pulse: 96   Resp: 20   Temp: 96.6 °F (35.9 °C)   TempSrc: Temporal   SpO2: 94%   Weight: 108 kg (237 lb 3.2 oz)   Height: 168.9 cm (66.5\")   PainSc: 5        Objective   Physical Exam  Vitals and nursing note reviewed.   Constitutional:       Appearance: Normal appearance. She is well-developed.   HENT:      Head: Normocephalic and atraumatic.      Nose: Nose normal.   Eyes:      General: Lids are normal.      Conjunctiva/sclera: Conjunctivae normal.   Pulmonary:      Effort: No respiratory distress.   Musculoskeletal:      Lumbar back: Tenderness and bony tenderness (bilateral L3-S1 moderate facet tenderness) present. Decreased range of motion. Negative right straight leg raise test and negative left straight leg raise test. No scoliosis.      Comments: + grocery cart sign  Mild Si joint tenderness bilaterally   Skin:     General: Skin is warm and dry.   Neurological:      Mental Status: She is alert and oriented to person, place, and time.      Gait: Gait normal.      Deep Tendon Reflexes:      Reflex Scores:       Patellar reflexes are 1+ on the right side and 1+ on the left side.       Achilles reflexes are 1+ on the right side and 1+ on the left side.     Comments: Slow gait   Psychiatric:  "        Speech: Speech normal.         Behavior: Behavior normal. Behavior is cooperative.         Thought Content: Thought content normal.         Judgment: Judgment normal.       Assessment & Plan   Diagnoses and all orders for this visit:    1. Lumbar facet arthropathy (Primary)        Luzmaria Pérez reports a pain score of 5.  Given her pain assessment as noted, treatment options were discussed and the following options were decided upon as a follow-up plan to address the patient's pain: continuation of current treatment plan for pain, use of non-medical modalities (ice, heat, stretching and/or behavior modifications), and lumbar interventions such as MBB/RFA.  --- Patient screened a PHQ-9 score of 1 on  7-22-25.   --- Bilateral L4-S1 MBB x2. No Blood thinners. Reviewed the procedure at length with the patient.  Included in the review was expectations, complications, risk and benefits.The procedure was described in detail and the risks, benefits and alternatives were discussed with the patient (including but not limited to: bleeding, infection, nerve damage, worsening of pain, inability to perform injection, paralysis, seizures, coma, no pain relief and death) who agreed to proceed.  Discussed the potential for sedation if warranted/wanted.  The procedure will plan to be performed at [facility] with fluoroscopic guidance(unless ultrasound is indicated) and could potentially have steroids and contrast dye used. Questions were answered and in a way the patient could understand.  Patient verbalized understanding and wishes to proceed.  This intervention will be ordered.  Discussed with patient that all procedures are part of a multimodal plan of care and include either formal PT or a home exercise program.  Patient has no evidence of coagulopathy or current infection.  --- Goal is for lumbar RFA.  --- Follow-up after procedure or sooner if needed.    -------  Education about Medial Branch Blockade  "and RF Therapy:    This medial branch blockade (MBB) suggested is intended for diagnostic purposes, with the intent of offering the patient Radiofrequency thermal rhizotomy (RF) if the MBB is diagnostically effective.  The diagnostic blockade is necessary to determine the likelihood that RF therapy could be efficacious in providing long term relief to the patient.    Medial branches are sensory nerve branches that connect to a facet joint and transmit sensations & pain signals from that joint.  Facet is a term for the type of joints found in the spine.  Medial branches are the nerves that go to a facet, and therefore are also sometimes called \"facet joint nerves\" (FJNs).      In a medial branch blockade procedure, xray fluoroscopy is used to verify the locations of the outside of the joint lines which are being targeted.  Under xray guidance, needles are placed to these areas.  Contrast dye is injected to confirm proper placement, with dye flowing over the joint area, and to ensure that the dye does not flow into unintended areas such as a vein.  When this is confirmed, local anesthetic is injected to block the medial branch at that joint level.      If MBBs are diagnostically successful in blocking pain, then the patient is most likely a great candidate for Radiofrequency of those facet joint nerves.  In the RF procedure, needles are placed to the joint lines in the same fashion, and after testing, the needle tips are heated to thermally treat the nerves, blocking the nerves by in essence damaging the nerves with the heat treatment(non-pulsed).       Medically, a successful RF procedure should provide a patient with 50% pain relief or more for at least 6 months.  Clinical experience suggests that successful patients receive relief more in the range of 12 months on average.  We also discussed that a fortunate minority of patients receive therapeutic success from the MBB, and may not require RF ablation.  If a " patient receives more than 8 weeks of relief from MBB, then occasional repeat MBB for therapeutic purposes is a very reasonable alternative therapy.  This course of therapy is consistent with our LCDs according to our CMS  in the area, and therefore other insurance providers should follow accordingly.  We will monitor our patients to screen for these therapeutic responders and will offer RF therapy only when necessary.        We discussed that MBB & RF are not without risks.  Guidelines regarding anticoagulant use & neuraxial procedures will be respected.  Patients that are ill or otherwise may be at risk for sepsis will not have their spines accessed by neuraxial injections of any type.  This patient will not be offered these therapies if there is an increased risk.   We discussed that there is a risk of postprocedural pain and also a risk of worsening of clinical picture with these procedures as with any neuraxial procedure.    -------    I spent 39 minutes caring for patient on this date of service. This time includes time spent by me in the following activities:reviewing tests, obtaining and/or reviewing a separately obtained history, performing a medically appropriate examination and/or evaluation , counseling and educating the patient/family/caregiver, ordering medications, tests, or procedures and documenting information in the medical record              Dictated utilizing Dragon dictation.

## 2025-08-18 ENCOUNTER — HOSPITAL ENCOUNTER (OUTPATIENT)
Dept: GENERAL RADIOLOGY | Facility: SURGERY CENTER | Age: 80
End: 2025-08-18
Payer: MEDICARE

## 2025-08-18 ENCOUNTER — HOSPITAL ENCOUNTER (OUTPATIENT)
Facility: SURGERY CENTER | Age: 80
Setting detail: HOSPITAL OUTPATIENT SURGERY
Discharge: HOME | End: 2025-08-18
Attending: ANESTHESIOLOGY | Admitting: ANESTHESIOLOGY
Payer: MEDICARE

## 2025-08-18 VITALS
HEART RATE: 72 BPM | RESPIRATION RATE: 16 BRPM | TEMPERATURE: 97.7 F | WEIGHT: 238 LBS | SYSTOLIC BLOOD PRESSURE: 148 MMHG | BODY MASS INDEX: 37.84 KG/M2 | OXYGEN SATURATION: 96 % | DIASTOLIC BLOOD PRESSURE: 78 MMHG

## 2025-08-18 DIAGNOSIS — M47.816 LUMBAR FACET ARTHROPATHY: ICD-10-CM

## 2025-08-18 DIAGNOSIS — Z41.9 SURGERY, ELECTIVE: ICD-10-CM

## 2025-08-18 PROCEDURE — 25010000002 BUPIVACAINE (PF) 0.25 % SOLUTION 10 ML VIAL: Performed by: ANESTHESIOLOGY

## 2025-08-18 PROCEDURE — 25010000002 LIDOCAINE PF 1% 1 % SOLUTION 5 ML VIAL: Performed by: ANESTHESIOLOGY

## 2025-08-18 PROCEDURE — 64494 INJ PARAVERT F JNT L/S 2 LEV: CPT | Performed by: ANESTHESIOLOGY

## 2025-08-18 PROCEDURE — 77002 NEEDLE LOCALIZATION BY XRAY: CPT

## 2025-08-18 PROCEDURE — 76000 FLUOROSCOPY <1 HR PHYS/QHP: CPT

## 2025-08-18 PROCEDURE — 64493 INJ PARAVERT F JNT L/S 1 LEV: CPT | Performed by: ANESTHESIOLOGY

## 2025-08-18 RX ORDER — MULTIPLE VITAMINS W/ MINERALS TAB 9MG-400MCG
1 TAB ORAL DAILY
COMMUNITY

## 2025-08-21 DIAGNOSIS — E03.8 OTHER SPECIFIED HYPOTHYROIDISM: Chronic | ICD-10-CM

## 2025-08-21 RX ORDER — LEVOTHYROXINE SODIUM 50 UG/1
50 TABLET ORAL
Qty: 90 TABLET | Refills: 1 | Status: SHIPPED | OUTPATIENT
Start: 2025-08-21

## 2025-08-25 ENCOUNTER — TRANSCRIBE ORDERS (OUTPATIENT)
Dept: ADMINISTRATIVE | Facility: HOSPITAL | Age: 80
End: 2025-08-25
Payer: MEDICARE

## 2025-08-25 DIAGNOSIS — R91.8 LUNG NODULES: Primary | ICD-10-CM

## 2025-08-26 ENCOUNTER — OFFICE VISIT (OUTPATIENT)
Dept: PAIN MEDICINE | Facility: CLINIC | Age: 80
End: 2025-08-26
Payer: MEDICARE

## 2025-08-26 ENCOUNTER — PREP FOR SURGERY (OUTPATIENT)
Dept: SURGERY | Facility: SURGERY CENTER | Age: 80
End: 2025-08-26
Payer: MEDICARE

## 2025-08-26 VITALS
TEMPERATURE: 98.4 F | BODY MASS INDEX: 37.83 KG/M2 | WEIGHT: 241 LBS | DIASTOLIC BLOOD PRESSURE: 80 MMHG | HEART RATE: 76 BPM | RESPIRATION RATE: 18 BRPM | SYSTOLIC BLOOD PRESSURE: 139 MMHG | OXYGEN SATURATION: 95 % | HEIGHT: 67 IN

## 2025-08-26 DIAGNOSIS — M47.816 LUMBAR FACET ARTHROPATHY: Primary | ICD-10-CM

## 2025-08-26 RX ORDER — SODIUM CHLORIDE 0.9 % (FLUSH) 0.9 %
10 SYRINGE (ML) INJECTION AS NEEDED
OUTPATIENT
Start: 2025-08-26

## 2025-08-26 RX ORDER — SODIUM CHLORIDE 0.9 % (FLUSH) 0.9 %
10 SYRINGE (ML) INJECTION EVERY 12 HOURS SCHEDULED
OUTPATIENT
Start: 2025-08-26

## 2025-08-27 ENCOUNTER — TRANSCRIBE ORDERS (OUTPATIENT)
Dept: SURGERY | Facility: SURGERY CENTER | Age: 80
End: 2025-08-27
Payer: MEDICARE

## 2025-08-27 DIAGNOSIS — Z41.9 SURGERY, ELECTIVE: Primary | ICD-10-CM

## (undated) DEVICE — GLV SURG TRIUMPH PF LTX 7.5 STRL

## (undated) DEVICE — EPIDURAL TRAY: Brand: MEDLINE INDUSTRIES, INC.

## (undated) DEVICE — NEEDLE, QUINCKE, 22GX5": Brand: MEDLINE